# Patient Record
Sex: FEMALE | Race: WHITE | URBAN - NONMETROPOLITAN AREA
[De-identification: names, ages, dates, MRNs, and addresses within clinical notes are randomized per-mention and may not be internally consistent; named-entity substitution may affect disease eponyms.]

---

## 2017-02-07 ENCOUNTER — OFFICE VISIT (OUTPATIENT)
Dept: URGENT CARE | Age: 37
End: 2017-02-07
Payer: COMMERCIAL

## 2017-02-07 VITALS
OXYGEN SATURATION: 99 % | DIASTOLIC BLOOD PRESSURE: 83 MMHG | RESPIRATION RATE: 18 BRPM | HEIGHT: 68 IN | TEMPERATURE: 98.5 F | HEART RATE: 71 BPM | SYSTOLIC BLOOD PRESSURE: 125 MMHG | WEIGHT: 230 LBS | BODY MASS INDEX: 34.86 KG/M2

## 2017-02-07 DIAGNOSIS — L03.115 CELLULITIS OF RIGHT LOWER EXTREMITY: Primary | ICD-10-CM

## 2017-02-07 PROCEDURE — 99203 OFFICE O/P NEW LOW 30 MIN: CPT | Performed by: NURSE PRACTITIONER

## 2017-02-07 RX ORDER — CLINDAMYCIN HYDROCHLORIDE 300 MG/1
300 CAPSULE ORAL 3 TIMES DAILY
Qty: 30 CAPSULE | Refills: 0 | Status: SHIPPED | OUTPATIENT
Start: 2017-02-07 | End: 2017-02-17

## 2017-02-11 LAB
GRAM STAIN RESULT: NORMAL
WOUND/ABSCESS: NORMAL

## 2018-03-26 ENCOUNTER — OUTSIDE FACILITY SERVICE (OUTPATIENT)
Dept: CARDIOLOGY | Facility: CLINIC | Age: 38
End: 2018-03-26

## 2018-03-26 PROCEDURE — 93306 TTE W/DOPPLER COMPLETE: CPT | Performed by: INTERNAL MEDICINE

## 2023-03-28 DIAGNOSIS — N93.9 ABNORMAL UTERINE BLEEDING (AUB): Primary | ICD-10-CM

## 2023-04-05 ENCOUNTER — HOSPITAL ENCOUNTER (OUTPATIENT)
Dept: GENERAL RADIOLOGY | Age: 43
Discharge: HOME | End: 2023-04-05
Payer: COMMERCIAL

## 2023-04-05 DIAGNOSIS — N93.9 ABNORMAL UTERINE BLEEDING (AUB): ICD-10-CM

## 2023-04-05 PROCEDURE — 76830 TRANSVAGINAL US NON-OB: CPT | Performed by: RADIOLOGY

## 2023-04-05 PROCEDURE — 76830 TRANSVAGINAL US NON-OB: CPT

## 2023-04-05 NOTE — PATIENT INSTRUCTIONS
improve your health. You may need to make lifestyle changes to gain or maintain weight and stay healthy, such as getting more healthy foods in your diet and doing exercises to build muscle. Where can you learn more? Go to http://www.woods.com/ and enter S176 to learn more about \"Body Mass Index: Care Instructions. \"  Current as of: August 25, 2022               Content Version: 13.6  © 2006-2023 Healthwise, Incorporated. Care instructions adapted under license by [facility] [place] ([facility]). If you have questions about a medical condition or this instruction, always ask your healthcare professional. [place] [address] any warranty or liability for your use of this information.

## 2023-04-06 ENCOUNTER — OFFICE VISIT (OUTPATIENT)
Dept: OBGYN CLINIC | Age: 43
End: 2023-04-06

## 2023-04-06 VITALS
DIASTOLIC BLOOD PRESSURE: 88 MMHG | WEIGHT: 244 LBS | BODY MASS INDEX: 36.98 KG/M2 | SYSTOLIC BLOOD PRESSURE: 130 MMHG | HEIGHT: 68 IN

## 2023-04-06 DIAGNOSIS — Z01.812 PRE-PROCEDURE LAB EXAM: ICD-10-CM

## 2023-04-06 DIAGNOSIS — Z76.89 ENCOUNTER TO ESTABLISH CARE: Primary | ICD-10-CM

## 2023-04-06 DIAGNOSIS — Z11.51 ENCOUNTER FOR SCREENING FOR HUMAN PAPILLOMAVIRUS (HPV): ICD-10-CM

## 2023-04-06 DIAGNOSIS — Z01.419 ENCOUNTER FOR CERVICAL PAP SMEAR WITH PELVIC EXAM: ICD-10-CM

## 2023-04-06 DIAGNOSIS — N93.9 ABNORMAL UTERINE BLEEDING (AUB): ICD-10-CM

## 2023-04-06 DIAGNOSIS — Z12.4 ENCOUNTER FOR SCREENING FOR CERVICAL CANCER: ICD-10-CM

## 2023-04-06 DIAGNOSIS — R93.89 THICKENED ENDOMETRIUM: ICD-10-CM

## 2023-04-06 DIAGNOSIS — Z12.31 ENCOUNTER FOR SCREENING MAMMOGRAM FOR MALIGNANT NEOPLASM OF BREAST: ICD-10-CM

## 2023-04-06 LAB
CONTROL: PRESENT
HPV16+18+H RISK 12 DNA SPEC-IMP: NORMAL
PREGNANCY TEST URINE, POC: NEGATIVE

## 2023-04-06 RX ORDER — UBIDECARENONE 100 MG
100 CAPSULE ORAL DAILY
COMMUNITY

## 2023-04-06 RX ORDER — POTASSIUM CHLORIDE 750 MG/1
10 TABLET, EXTENDED RELEASE ORAL DAILY
COMMUNITY
Start: 2020-10-01

## 2023-04-06 RX ORDER — ASPIRIN 81 MG/1
81 TABLET, CHEWABLE ORAL DAILY
COMMUNITY
Start: 2020-01-09

## 2023-04-06 NOTE — PROGRESS NOTES
Pt presents today for pap smear and breast exam.    She also complains of     Last mammogram: never  Last pap smear: unsure   Sexually active: Yes  Sexual preference: Male  Contraception: nothing  : 5  Para: 5  AB: 0  Last bone density: never   Last colonoscopy: never  Menarche: age   LMP: 3/11/2023 (lasted 18 days)  Menses: irregular    Had really heavy after valve replacement (was on plavix at that time with ASA). In January was really light but lasted longer.
SMEAR  -     Human papillomavirus (HPV) DNA Probe Thin Prep High Risk  6. Abnormal uterine bleeding (AUB)  -     00071 - [place] BIOPSY OF UTERUS LINING  -     Specimen to Pathology Outpatient  7. Pre-procedure lab exam  -     POCT urine pregnancy       Patient Active Problem List   Diagnosis    Tetralogy of Fallot s/p repair    Tetralogy of Fallot       Patient's last menstrual period was 03/11/2023 (exact date). No obstetric history on file. Past Medical History:   Diagnosis Date    Tetralogy of Fallot      Past Surgical History:   Procedure Laterality Date    CARDIAC SURGERY  09/2020    patching hole and widen valve    CARDIAC VALVE REPLACEMENT  2020    TETRALOGY OF FALLOT REPAIR  01/01/1985    [facility]     Family History   Problem Relation Age of Onset    Heart Disease Maternal Grandmother     Heart Disease Paternal Grandfather      Social History     Tobacco Use    Smoking status: Never    Smokeless tobacco: Not on file   Substance Use Topics    Alcohol use: No       Current Outpatient Medications   Medication Sig Dispense Refill    aspirin 81 MG chewable tablet Take 1 tablet by mouth daily      coenzyme Q10 100 MG CAPS capsule Take 1 capsule by mouth daily      Multiple Vitamin (MULTI-VITAMIN DAILY PO) Take 1 tablet by mouth daily      Omega-3 Fatty Acids (FISH OIL PO) Take 1 capsule by mouth daily      potassium chloride (KLOR-CON M) 10 MEQ extended release tablet Take 1 tablet by mouth daily      Ferrous Sulfate (IRON PO) Take by mouth       No current facility-administered medications for this visit. Allergies   Allergen Reactions    Plavix [Clopidogrel] Hives and Swelling     Vitals:    04/06/23 1114   BP: 130/88   Site: Left Upper Arm   Position: Sitting   Cuff Size: Large Adult   Weight: 244 lb (110.7 kg)   Height: 5' 8\" (1.727 m)     Body mass index is 37.1 kg/m². A comprehensive review of systems was negative except for what was noted in the HPI.      Physical Exam  Constitutional:

## 2023-05-06 SDOH — ECONOMIC STABILITY: HOUSING INSECURITY
IN THE LAST 12 MONTHS, WAS THERE A TIME WHEN YOU DID NOT HAVE A STEADY PLACE TO SLEEP OR SLEPT IN A SHELTER (INCLUDING NOW)?: PATIENT REFUSED

## 2023-05-06 SDOH — ECONOMIC STABILITY: FOOD INSECURITY: WITHIN THE PAST 12 MONTHS, YOU WORRIED THAT YOUR FOOD WOULD RUN OUT BEFORE YOU GOT MONEY TO BUY MORE.: PATIENT DECLINED

## 2023-05-06 SDOH — ECONOMIC STABILITY: INCOME INSECURITY: HOW HARD IS IT FOR YOU TO PAY FOR THE VERY BASICS LIKE FOOD, HOUSING, MEDICAL CARE, AND HEATING?: PATIENT DECLINED

## 2023-05-06 SDOH — ECONOMIC STABILITY: TRANSPORTATION INSECURITY
IN THE PAST 12 MONTHS, HAS LACK OF TRANSPORTATION KEPT YOU FROM MEETINGS, WORK, OR FROM GETTING THINGS NEEDED FOR DAILY LIVING?: PATIENT DECLINED

## 2023-05-06 SDOH — ECONOMIC STABILITY: FOOD INSECURITY: WITHIN THE PAST 12 MONTHS, THE FOOD YOU BOUGHT JUST DIDN'T LAST AND YOU DIDN'T HAVE MONEY TO GET MORE.: PATIENT DECLINED

## 2023-05-08 ASSESSMENT — ENCOUNTER SYMPTOMS
GASTROINTESTINAL NEGATIVE: 1
EYES NEGATIVE: 1
RESPIRATORY NEGATIVE: 1

## 2023-05-08 NOTE — PROGRESS NOTES
Rogelio Smith is a 38 y.o. who presents today for a surgical consultation for her abnormal uterine bleeding. Pt states she has had irregular bleeding since December. Her cycle in March was very heavy, with blood clots. Pt had a valve replacement in 2020, pt gained weight after this. Pt has not followed up with her cardiac surgeon in [place]. Pt is using a natural progesterone cream that she got online. Review of Systems   Constitutional: Negative. HENT: Negative. Eyes: Negative. Respiratory: Negative. Cardiovascular: Negative. Gastrointestinal: Negative. Genitourinary: Negative. Negative for dysuria, frequency, menstrual problem, pelvic pain, urgency and vaginal discharge. Skin: Negative. Neurological: Negative. Psychiatric/Behavioral: Negative.        Past Medical History:   Diagnosis Date    Tetralogy of Fallot        Past Surgical History:   Procedure Laterality Date    CARDIAC SURGERY  09/2020    patching hole and widen valve    CARDIAC VALVE REPLACEMENT  2020    TETRALOGY OF FALLOT REPAIR  01/01/1985    [facility]       Family History   Problem Relation Age of Onset    Heart Disease Maternal Grandmother     Heart Disease Paternal Grandfather        Social History     Socioeconomic History    Marital status:      Spouse name: Not on file    Number of children: Not on file    Years of education: Not on file    Highest education level: Not on file   Occupational History    Not on file   Tobacco Use    Smoking status: Never    Smokeless tobacco: Not on file   Substance and Sexual Activity    Alcohol use: No    Drug use: No    Sexual activity: Not on file   Other Topics Concern    Not on file   Social History Narrative    Not on file     Social Determinants of Health     Financial Resource Strain: Not on file   Food Insecurity: Not on file   Transportation Needs: Not on file   Physical Activity: Not on file   Stress: Not on file   Social Connections: Not on

## 2023-05-09 ENCOUNTER — OFFICE VISIT (OUTPATIENT)
Dept: OBGYN CLINIC | Age: 43
End: 2023-05-09
Payer: COMMERCIAL

## 2023-05-09 VITALS
BODY MASS INDEX: 36.98 KG/M2 | SYSTOLIC BLOOD PRESSURE: 134 MMHG | WEIGHT: 244 LBS | DIASTOLIC BLOOD PRESSURE: 91 MMHG | HEART RATE: 73 BPM | HEIGHT: 68 IN

## 2023-05-09 DIAGNOSIS — R93.89 THICKENED ENDOMETRIUM: ICD-10-CM

## 2023-05-09 DIAGNOSIS — Z71.9 ENCOUNTER FOR CONSULTATION: Primary | ICD-10-CM

## 2023-05-09 DIAGNOSIS — N93.9 ABNORMAL UTERINE BLEEDING (AUB): ICD-10-CM

## 2023-05-09 PROCEDURE — 99214 OFFICE O/P EST MOD 30 MIN: CPT | Performed by: OBSTETRICS & GYNECOLOGY

## 2023-05-09 NOTE — PROGRESS NOTES
Pt is here for a surgery consult for D&C.      Thickened, heterogeneous endometrium, measuring up to 21 mm in thickness shown on ultrasound 4/05

## 2023-05-18 ENCOUNTER — TELEPHONE (OUTPATIENT)
Dept: OBGYN CLINIC | Age: 43
End: 2023-05-18

## 2024-02-07 DIAGNOSIS — N91.2 AMENORRHEA: Primary | ICD-10-CM

## 2024-02-08 DIAGNOSIS — N91.2 AMENORRHEA: ICD-10-CM

## 2024-02-08 LAB
GONADOTROPIN, CHORIONIC (HCG) QUANT: ABNORMAL MIU/ML (ref 0–5.3)
PROGEST SERPL-MCNC: 14.07 NG/ML

## 2024-02-09 DIAGNOSIS — N91.2 AMENORRHEA: Primary | ICD-10-CM

## 2024-02-13 ENCOUNTER — INITIAL PRENATAL (OUTPATIENT)
Dept: OBGYN CLINIC | Age: 44
End: 2024-02-13

## 2024-02-13 VITALS — SYSTOLIC BLOOD PRESSURE: 142 MMHG | DIASTOLIC BLOOD PRESSURE: 78 MMHG | WEIGHT: 252 LBS | BODY MASS INDEX: 38.32 KG/M2

## 2024-02-13 DIAGNOSIS — Z3A.12 12 WEEKS GESTATION OF PREGNANCY: ICD-10-CM

## 2024-02-13 DIAGNOSIS — O09.521 MULTIGRAVIDA OF ADVANCED MATERNAL AGE IN FIRST TRIMESTER: ICD-10-CM

## 2024-02-13 DIAGNOSIS — Z34.81 ENCOUNTER FOR SUPERVISION OF OTHER NORMAL PREGNANCY IN FIRST TRIMESTER: Primary | ICD-10-CM

## 2024-02-13 DIAGNOSIS — Z3A.12 12 WEEKS GESTATION OF PREGNANCY: Primary | ICD-10-CM

## 2024-02-13 LAB
ABO/RH: NORMAL
ANTIBODY SCREEN: NORMAL
HCV AB SERPL QL IA: NORMAL

## 2024-02-13 PROCEDURE — 0500F INITIAL PRENATAL CARE VISIT: CPT | Performed by: ADVANCED PRACTICE MIDWIFE

## 2024-02-13 NOTE — PROGRESS NOTES
CNM Prenatal Office Note  Subjective:  Jewels Palomares is here for initial obstetrical visit. She has had confirmation of pregnancy. Her history has been reviewed and OB complications identified. Today she is 12w0d weeks EGA.     OB Complications Identified:  AMA  Pulmonary Valve replacement  Hx tetrology of Felot    Problems/Complaints today:  none  Objective:  Mother's Prenatal Vitals  BP: (!) 142/78  Weight - Scale: 114.3 kg (252 lb)  Prenatal Fetal Information  Fetal HR: 152 pg  Pt is A&Ox3, in no acute distress. Normocephalic, atraumatic. PERRL. Resp even and non-labored. Skin pink, warm & dry. Gravid abdomen. PRYOR's well. Gait steady.   Assessment:    IUP at 12w0d wks      Diagnosis Orders   1. 12 weeks gestation of pregnancy  Chlamydia/N. Gonorrhoeae/T.Vaginalis, Urine    Varicella Zoster Antibody, IgG    Herpes simplex virus (HSV) I/II antibodies IgG & IgM w/ reflex    Hepatitis C Antibody    HIV Obstetric Panel    Culture, Urine      2. Multigravida of advanced maternal age in first trimester  Chlamydia/N. Gonorrhoeae/T.Vaginalis, Urine    Varicella Zoster Antibody, IgG    Herpes simplex virus (HSV) I/II antibodies IgG & IgM w/ reflex    Hepatitis C Antibody    HIV Obstetric Panel    Culture, Urine        Plan:  Problems/Complaints Management Plan:  Continue progesterone through 14 weeks  PNL today.  Routine OB Management Plan:  Pt counseled on balanced nutrition, adequate fluid intake, taking PNV daily, and exercise. Continue with routine prenatal care.  RTC in 4 wks for prenatal visit, u/s, physical exam, and results  MEDICATIONS:  No orders of the defined types were placed in this encounter.    ORDERS:  Orders Placed This Encounter   Procedures    Chlamydia/N. Gonorrhoeae/T.Vaginalis, Urine    Culture, Urine    Varicella Zoster Antibody, IgG    Herpes simplex virus (HSV) I/II antibodies IgG & IgM w/ reflex    Hepatitis C Antibody    HIV Obstetric Panel

## 2024-02-13 NOTE — PROGRESS NOTES
Patient presents today for routine prenatal care. Pt denies any vaginal leaking bleeding or contractions.

## 2024-02-15 LAB
BASOPHILS # BLD: 0 K/UL (ref 0–0.2)
BASOPHILS NFR BLD: 0.4 % (ref 0–1)
EOSINOPHIL # BLD: 0.2 K/UL (ref 0–0.6)
EOSINOPHIL NFR BLD: 2.1 % (ref 0–5)
ERYTHROCYTE [DISTWIDTH] IN BLOOD BY AUTOMATED COUNT: 16.1 % (ref 11.5–14.5)
HBV SURFACE AG SERPL QL IA: ABNORMAL
HCT VFR BLD AUTO: 35.7 % (ref 37–47)
HGB BLD-MCNC: 11.8 G/DL (ref 12–16)
HIV-1 P24 AG: ABNORMAL
HIV1+2 AB SERPLBLD QL IA.RAPID: ABNORMAL
IMM GRANULOCYTES # BLD: 0.1 K/UL
LYMPHOCYTES # BLD: 2.2 K/UL (ref 1.1–4.5)
LYMPHOCYTES NFR BLD: 22.4 % (ref 20–40)
MCH RBC QN AUTO: 26.5 PG (ref 27–31)
MCHC RBC AUTO-ENTMCNC: 33.1 G/DL (ref 33–37)
MCV RBC AUTO: 80 FL (ref 81–99)
MONOCYTES # BLD: 0.6 K/UL (ref 0–0.9)
MONOCYTES NFR BLD: 6.6 % (ref 0–10)
NEUTROPHILS # BLD: 6.5 K/UL (ref 1.5–7.5)
NEUTS SEG NFR BLD: 67.9 % (ref 50–65)
PLATELET # BLD AUTO: 271 K/UL (ref 130–400)
PMV BLD AUTO: 9.7 FL (ref 9.4–12.3)
RBC # BLD AUTO: 4.46 M/UL (ref 4.2–5.4)
RPR SER QL: ABNORMAL
RUBV IGG SER-ACNC: REACTIVE [IU]/ML
WBC # BLD AUTO: 9.6 K/UL (ref 4.8–10.8)

## 2024-02-17 LAB
HSV1 GG IGG SER-ACNC: <0.01 IV
HSV1+2 IGG SER IA-ACNC: 1.53 IV
HSV1+2 IGM SER IA-ACNC: 0.23 IV
HSV2 GG IGG SER-ACNC: 0.03 IV

## 2024-02-19 LAB — VZV IGG SER QL IA: 1.27

## 2024-02-20 ENCOUNTER — HOSPITAL ENCOUNTER (OUTPATIENT)
Dept: NON INVASIVE DIAGNOSTICS | Age: 44
Discharge: HOME | End: 2024-02-20
Payer: COMMERCIAL

## 2024-02-20 DIAGNOSIS — Z95.2 STATUS POST PULMONARY VALVE REPLACEMENT: ICD-10-CM

## 2024-02-20 PROCEDURE — 93306 TTE W/DOPPLER COMPLETE: CPT

## 2024-03-09 SDOH — ECONOMIC STABILITY: INCOME INSECURITY: HOW HARD IS IT FOR YOU TO PAY FOR THE VERY BASICS LIKE FOOD, HOUSING, MEDICAL CARE, AND HEATING?: PATIENT DECLINED

## 2024-03-09 SDOH — ECONOMIC STABILITY: HOUSING INSECURITY
IN THE LAST 12 MONTHS, WAS THERE A TIME WHEN YOU DID NOT HAVE A STEADY PLACE TO SLEEP OR SLEPT IN A SHELTER (INCLUDING NOW)?: PATIENT DECLINED

## 2024-03-09 SDOH — ECONOMIC STABILITY: FOOD INSECURITY: WITHIN THE PAST 12 MONTHS, THE FOOD YOU BOUGHT JUST DIDN'T LAST AND YOU DIDN'T HAVE MONEY TO GET MORE.: PATIENT DECLINED

## 2024-03-09 SDOH — ECONOMIC STABILITY: FOOD INSECURITY: WITHIN THE PAST 12 MONTHS, YOU WORRIED THAT YOUR FOOD WOULD RUN OUT BEFORE YOU GOT MONEY TO BUY MORE.: PATIENT DECLINED

## 2024-03-12 ENCOUNTER — ROUTINE PRENATAL (OUTPATIENT)
Dept: OBGYN CLINIC | Age: 44
End: 2024-03-12

## 2024-03-12 VITALS
DIASTOLIC BLOOD PRESSURE: 78 MMHG | HEART RATE: 82 BPM | WEIGHT: 250 LBS | BODY MASS INDEX: 38.01 KG/M2 | SYSTOLIC BLOOD PRESSURE: 122 MMHG

## 2024-03-12 DIAGNOSIS — O09.522 ADVANCED MATERNAL AGE IN MULTIGRAVIDA, SECOND TRIMESTER: ICD-10-CM

## 2024-03-12 DIAGNOSIS — Z3A.16 16 WEEKS GESTATION OF PREGNANCY: Primary | ICD-10-CM

## 2024-03-12 PROCEDURE — 0502F SUBSEQUENT PRENATAL CARE: CPT | Performed by: ADVANCED PRACTICE MIDWIFE

## 2024-03-12 NOTE — PATIENT INSTRUCTIONS
Patient Education        Weeks 14 to 18 of Your Pregnancy: Care Instructions  Around this time, you may start to look pregnant. Your baby is now able to pass urine. And the first stool (meconium) is starting to collect in your baby's intestines. Hair is starting to grow on your baby's head.    You may notice some skin changes, such as itchy spots on your palms or acne on your face.    At your next doctor visit, you may have an ultrasound. So you might think about whether you want to know the sex of your baby. Also ask your doctor about flu and COVID-19 shots.     How to reduce stress   Ask for help when you need it.  Try to avoid things that cause you stress.  Seek out things that relieve stress, such as breathing exercises or yoga.     How to get exercise   If you don't usually exercise, start slowly. Short walks may be a good choice.  Try to be active 30 minutes a day, at least 5 days a week.  Avoid activities where you're more likely to fall.  Use light weights to reduce stress on your joints.     How to stay at a healthy weight for you   Talk to your doctor or midwife about how much weight you should gain.  It's generally best to gain:  About 28 to 40 pounds if you're underweight.  About 25 to 35 pounds if you're at a healthy weight.  About 15 to 25 pounds if you're overweight.  About 11 to 20 pounds if you're very overweight (obese).  Follow-up care is a key part of your treatment and safety. Be sure to make and go to all appointments, and call your doctor if you are having problems. It's also a good idea to know your test results and keep a list of the medicines you take.  Where can you learn more?  Go to https://www.SHADOW.net/patientEd and enter I453 to learn more about \"Weeks 14 to 18 of Your Pregnancy: Care Instructions.\"  Current as of: July 10, 2023               Content Version: 14.0  © 7131-2492 Healthwise, Incorporated.   Care instructions adapted under license by [employer]. If you have

## 2024-03-12 NOTE — PROGRESS NOTES
[facility] Prenatal Office Note  Subjective:  Jewels Palomares is here for a return obstetrical visit. Today she is 16w0d weeks EGA.   She is taking her prenatal vitamins and is aware of nutrition needs. She reports the following:    Problems/complaints today:  None  Objective:  Mother's Prenatal Vitals  BP: 122/78  Weight - Scale: 113.4 kg (250 lb)  Pulse: 82  Patient Position: Sitting  Prenatal Fetal Information  Fetal HR: 152  Movement: Increased  Pt is A&Ox3, in no acute distress. Normocephalic, atraumatic. PERRL. Resp even and non-labored. Skin pink, warm & dry. Gravid abdomen. PRYOR's well. Gait steady.   Assessment:    IUP at 16w0d wks      Diagnosis Orders   1. 16 weeks gestation of pregnancy        2. Advanced maternal age in multigravida, second trimester          Plan:  Problems/complaints Management Plan:  None  Routine OB Management Plan:  Pt counseled on balanced nutrition, adequate fluid intake, taking PNV daily, and exercise along with  Anatomy scheduled  Continue with routine prenatal care.  RTC in 4 wks for prenatal visit.      MEDICATIONS:  No orders of the defined types were placed in this encounter.    ORDERS:  No orders of the defined types were placed in this encounter.      More than 50% of this 20 min visit was education and counseling.      I aVnesa Jordan LPN, am scribing for and in the presence of Shaye Guerrero CNM  3/12/24  10:10 AM CDT .  I have seen and examined the patient independently.  I reviewed all laboratory and imaging studies that are relevant.  I have reviewed and made any appropriate changes to the HPI.    Electronically signed by DAMON Loya CNM on 3/12/24  at 10:25 AM CDT

## 2024-04-08 NOTE — PATIENT INSTRUCTIONS
Patient Education        Weeks 18 to 22 of Your Pregnancy: Care Instructions  At this stage you may find that your nausea and fatigue are gone. You may feel better overall and have more energy. But you might now also have some new discomforts, like sleep problems or leg cramps.    You may start to feel your baby move. These movements can feel like butterflies or bubbles.    Babies at this stage can now suck their thumbs.    Get some exercise every day.  And avoid caffeine late in the day.     Take a warm shower or bath before bed.  Try relaxation exercises to calm your mind and body.     Use extra pillows.  They can help you get comfortable.     Don't use sleeping pills or alcohol.  They could harm your baby.     For leg cramps, stretch and apply heat.  A warm bath, leg warmers, a heating pad, or a hot water bottle can help with muscle aches.   Stretches for leg cramps    Straighten your leg and bend your foot (flex your ankle) slowly upward, toward your knee. Bend your toes up and down.    Stand on a flat surface. Stretch your toes upward. For balance, hold on to the wall or something stable. If it feels okay, take small steps walking on your heels.  Follow-up care is a key part of your treatment and safety. Be sure to make and go to all appointments, and call your doctor if you are having problems. It's also a good idea to know your test results and keep a list of the medicines you take.  Where can you learn more?  Go to https://www.Toonimo.net/patientEd and enter W603 to learn more about \"Weeks 18 to 22 of Your Pregnancy: Care Instructions.\"  Current as of: July 10, 2023               Content Version: 14.0  © 4726-7461 [employer].   Care instructions adapted under license by [employer]. If you have questions about a medical condition or this instruction, always ask your healthcare professional. [employer] disclaims any warranty or liability for your use of this information.

## 2024-04-09 ENCOUNTER — ROUTINE PRENATAL (OUTPATIENT)
Dept: OBGYN CLINIC | Age: 44
End: 2024-04-09

## 2024-04-09 VITALS — DIASTOLIC BLOOD PRESSURE: 89 MMHG | SYSTOLIC BLOOD PRESSURE: 136 MMHG | WEIGHT: 254 LBS | BODY MASS INDEX: 38.62 KG/M2

## 2024-04-09 DIAGNOSIS — Z34.82 PRENATAL CARE, SUBSEQUENT PREGNANCY IN SECOND TRIMESTER: ICD-10-CM

## 2024-04-09 DIAGNOSIS — Z3A.20 20 WEEKS GESTATION OF PREGNANCY: Primary | ICD-10-CM

## 2024-04-09 DIAGNOSIS — O09.522 ADVANCED MATERNAL AGE IN MULTIGRAVIDA, SECOND TRIMESTER: ICD-10-CM

## 2024-04-09 PROCEDURE — 0502F SUBSEQUENT PRENATAL CARE: CPT | Performed by: OBSTETRICS & GYNECOLOGY

## 2024-04-09 NOTE — PROGRESS NOTES
Pt states she feels ok and is without complaints. Pt admits to +FM and denies ctxs, ROM, and vaginal bleeding. PTL precautions/FKC reviewed with pt.

## 2024-05-07 ENCOUNTER — ROUTINE PRENATAL (OUTPATIENT)
Dept: OBGYN CLINIC | Age: 44
End: 2024-05-07

## 2024-05-07 VITALS — WEIGHT: 257 LBS | DIASTOLIC BLOOD PRESSURE: 79 MMHG | BODY MASS INDEX: 39.08 KG/M2 | SYSTOLIC BLOOD PRESSURE: 125 MMHG

## 2024-05-07 DIAGNOSIS — O09.522 ADVANCED MATERNAL AGE IN MULTIGRAVIDA, SECOND TRIMESTER: Primary | ICD-10-CM

## 2024-05-07 DIAGNOSIS — Z3A.24 24 WEEKS GESTATION OF PREGNANCY: ICD-10-CM

## 2024-05-07 PROCEDURE — 0502F SUBSEQUENT PRENATAL CARE: CPT | Performed by: ADVANCED PRACTICE MIDWIFE

## 2024-05-07 NOTE — PROGRESS NOTES
[facility] Prenatal Office Note  Subjective:  Jewels Palomares is here for a return obstetrical visit. Today she is 24w0d weeks EGA.   She is taking her prenatal vitamins and is aware of nutrition needs. She reports the following:    Problems/complaints today:  None  Objective:  Mother's Prenatal Vitals  BP: 125/79  Weight - Scale: 116.6 kg (257 lb)  Prenatal Fetal Information  Fetal HR: 143  Movement: Present  Pt is A&Ox3, in no acute distress. Normocephalic, atraumatic. PERRL. Resp even and non-labored. Skin pink, warm & dry. Gravid abdomen. PRYOR's well. Gait steady.   Assessment:    IUP at 24w0d wks      Diagnosis Orders   1. Advanced maternal age in multigravida, second trimester        2. 24 weeks gestation of pregnancy          Plan:  Problems/complaints Management Plan:  None  Routine OB Management Plan:  Pt counseled on balanced nutrition, adequate fluid intake, taking PNV daily, and exercise along with  GCT next visit  Continue with routine prenatal care.  RTC in 4 wks for prenatal visit.      MEDICATIONS:  No orders of the defined types were placed in this encounter.    ORDERS:  No orders of the defined types were placed in this encounter.      More than 50% of this 20 min visit was education and counseling.        I Vanesa Jordan LPN, am scribing for and in the presence of Shaye Guerrero CNM  5/7/24  10:56 AM CDT .  I have seen and examined the patient independently.  I reviewed all laboratory and imaging studies that are relevant.  I have reviewed and made any appropriate changes to the HPI.    Electronically signed by DAMON Loya CNM on 5/7/24  at 12:14 PM CDT

## 2024-05-13 DIAGNOSIS — Z3A.24 24 WEEKS GESTATION OF PREGNANCY: Primary | ICD-10-CM

## 2024-05-13 DIAGNOSIS — Z3A.24 24 WEEKS GESTATION OF PREGNANCY: ICD-10-CM

## 2024-06-04 ENCOUNTER — ROUTINE PRENATAL (OUTPATIENT)
Dept: OBGYN CLINIC | Age: 44
End: 2024-06-04

## 2024-06-04 VITALS
SYSTOLIC BLOOD PRESSURE: 125 MMHG | HEART RATE: 89 BPM | BODY MASS INDEX: 39.08 KG/M2 | WEIGHT: 257 LBS | DIASTOLIC BLOOD PRESSURE: 75 MMHG

## 2024-06-04 DIAGNOSIS — Q24.9 MATERNAL CONGENITAL CARDIAC ANOMALY COMPLICATING PREGNANCY: ICD-10-CM

## 2024-06-04 DIAGNOSIS — O99.891 MATERNAL CONGENITAL CARDIAC ANOMALY COMPLICATING PREGNANCY: ICD-10-CM

## 2024-06-04 DIAGNOSIS — Z87.74 PAST HISTORY OF TETRALOGY OF FALLOT, POST SURGICAL REPAIR: ICD-10-CM

## 2024-06-04 DIAGNOSIS — O09.522 ADVANCED MATERNAL AGE IN MULTIGRAVIDA, SECOND TRIMESTER: ICD-10-CM

## 2024-06-04 DIAGNOSIS — E66.8 OTHER OBESITY AFFECTING PREGNANCY, ANTEPARTUM: ICD-10-CM

## 2024-06-04 DIAGNOSIS — Z3A.28 28 WEEKS GESTATION OF PREGNANCY: ICD-10-CM

## 2024-06-04 DIAGNOSIS — O09.522 ADVANCED MATERNAL AGE IN MULTIGRAVIDA, SECOND TRIMESTER: Primary | ICD-10-CM

## 2024-06-04 DIAGNOSIS — O99.210 OTHER OBESITY AFFECTING PREGNANCY, ANTEPARTUM: ICD-10-CM

## 2024-06-04 DIAGNOSIS — Z3A.28 28 WEEKS GESTATION OF PREGNANCY: Primary | ICD-10-CM

## 2024-06-04 DIAGNOSIS — O09.523 ADVANCED MATERNAL AGE IN MULTIGRAVIDA, THIRD TRIMESTER: ICD-10-CM

## 2024-06-04 LAB
ANTIBODY SCREEN: NORMAL
BASOPHILS # BLD: 0 K/UL (ref 0–0.2)
BASOPHILS NFR BLD: 0.4 % (ref 0–1)
EOSINOPHIL # BLD: 0.2 K/UL (ref 0–0.6)
EOSINOPHIL NFR BLD: 2.2 % (ref 0–5)
ERYTHROCYTE [DISTWIDTH] IN BLOOD BY AUTOMATED COUNT: 14 % (ref 11.5–14.5)
GLUCOSE 1H P MEAL SERPL-MCNC: 79 MG/DL (ref 75–140)
HCT VFR BLD AUTO: 35.7 % (ref 37–47)
HGB BLD-MCNC: 11.3 G/DL (ref 12–16)
IMM GRANULOCYTES # BLD: 0.1 K/UL
LYMPHOCYTES # BLD: 1.4 K/UL (ref 1.1–4.5)
LYMPHOCYTES NFR BLD: 19.2 % (ref 20–40)
MCH RBC QN AUTO: 28 PG (ref 27–31)
MCHC RBC AUTO-ENTMCNC: 31.7 G/DL (ref 33–37)
MCV RBC AUTO: 88.4 FL (ref 81–99)
MONOCYTES # BLD: 0.6 K/UL (ref 0–0.9)
MONOCYTES NFR BLD: 8.5 % (ref 0–10)
NEUTROPHILS # BLD: 5 K/UL (ref 1.5–7.5)
NEUTS SEG NFR BLD: 69 % (ref 50–65)
PLATELET # BLD AUTO: 223 K/UL (ref 130–400)
PMV BLD AUTO: 10 FL (ref 9.4–12.3)
RBC # BLD AUTO: 4.04 M/UL (ref 4.2–5.4)
WBC # BLD AUTO: 7.3 K/UL (ref 4.8–10.8)

## 2024-06-04 PROCEDURE — 0502F SUBSEQUENT PRENATAL CARE: CPT | Performed by: ADVANCED PRACTICE MIDWIFE

## 2024-06-04 NOTE — PROGRESS NOTES
CNM Prenatal Office Note  Subjective:  Jewels Palomares is here for a return obstetrical visit. Today she is 28w0d weeks EGA.  Pt does feel fetal movement regularly. Denies headaches, RUQ pain, or visual changes as well as contractions, vaginal bleeding or leaking of fluid. 1 hour GCT today. Rhogam indicated.   Problems/Complaints today:  none  Objective:  Mother's Prenatal Vitals  BP: 125/75  Weight - Scale: 116.6 kg (257 lb)  Pulse: 89  Patient Position: Sitting  Prenatal Fetal Information  Fundal Height (cm): 29 cm  Fetal HR:   Movement: Present  Pt is A&Ox3, in no acute distress. Normocephalic, atraumatic. PERRL. Resp even and non-labored. Skin pink, warm & dry. Gravid abdomen. PRYOR's well. Gait steady. EPDS Screenin/30  Assessment:    IUP at 28w0d wks      Diagnosis Orders   1. 28 weeks gestation of pregnancy        2. Advanced maternal age in multigravida, third trimester        3. Grand multipara in labor in third trimester        4. Other obesity affecting pregnancy, antepartum        5. Past history of tetralogy of Fallot, post surgical repair        6. Maternal congenital cardiac anomaly complicating pregnancy          Plan:  Problems/Complaints Management Plan:  Growth u/s this week/next week  Cardiology f/u for Jewels  Begin weekly surveillance @ 34 weeks  Routine OB Management Plan:  Third trimester teaching completed including warning signs for pre-eclampsia (blurred vision, seeing spots/sparkles, sudden increased weight gain or profound edema, epigastric pain), FKC (decreased fetal movments),  labor ( contractions or watery discharge, vaginal bleeding, cramping), n/v, chills, and or fever. Instructed pt to come to office or go to LDR if these symptoms presented. Pt voiced understanding.   Pt counseled on balanced nutrition, adequate fluid intake, taking PNV daily, and exercise along with GHTN precautions, Kick count, and  labor  Continue with routine prenatal care.  RTC

## 2024-06-06 LAB — RPR SER QL: NORMAL

## 2024-06-10 DIAGNOSIS — Z3A.28 28 WEEKS GESTATION OF PREGNANCY: ICD-10-CM

## 2024-06-10 DIAGNOSIS — Z3A.28 28 WEEKS GESTATION OF PREGNANCY: Primary | ICD-10-CM

## 2024-06-17 NOTE — PATIENT INSTRUCTIONS
diphtheria, and pertussis.  Diphtheria and pertussis spread from person to person. Tetanus enters the body through cuts or wounds.  TETANUS (T) causes painful stiffening of the muscles. Tetanus can lead to serious health problems, including being unable to open the mouth, having trouble swallowing and breathing, or death.  DIPHTHERIA (D) can lead to difficulty breathing, heart failure, paralysis, or death.  PERTUSSIS (aP), also known as \"whooping cough,\" can cause uncontrollable, violent coughing that makes it hard to breathe, eat, or drink. Pertussis can be extremely serious especially in babies and young children, causing pneumonia, convulsions, brain damage, or death. In teens and adults, it can cause weight loss, loss of bladder control, passing out, and rib fractures from severe coughing.  Tdap vaccine  Tdap is only for children 7 years and older, adolescents, and adults.  Adolescents should receive a single dose of Tdap, preferably at age 11 or 12 years.  Pregnant people should get a dose of Tdap during every pregnancy, preferably during the early part of the third trimester, to help protect the  from pertussis. Infants are most at risk for severe, life-threatening complications from pertussis.  Adults who have never received Tdap should get a dose of Tdap.  Also, adults should receive a booster dose of either Tdap or Td (a different vaccine that protects against tetanus and diphtheria but not pertussis) every 10 years, or after 5 years in the case of a severe or dirty wound or burn.  Tdap may be given at the same time as other vaccines.  Talk with your health care provider  Tell your vaccination provider if the person getting the vaccine:  Has had an allergic reaction after a previous dose of any vaccine that protects against tetanus, diphtheria, or pertussis, or has any severe, life-threatening allergies  Has had a coma, decreased level of consciousness, or prolonged seizures within 7 days after a

## 2024-06-18 ENCOUNTER — TELEPHONE (OUTPATIENT)
Dept: OBGYN CLINIC | Age: 44
End: 2024-06-18

## 2024-06-18 ENCOUNTER — ROUTINE PRENATAL (OUTPATIENT)
Dept: OBGYN CLINIC | Age: 44
End: 2024-06-18

## 2024-06-18 VITALS
SYSTOLIC BLOOD PRESSURE: 119 MMHG | DIASTOLIC BLOOD PRESSURE: 71 MMHG | WEIGHT: 259 LBS | BODY MASS INDEX: 39.38 KG/M2 | HEART RATE: 88 BPM

## 2024-06-18 DIAGNOSIS — O09.522 ADVANCED MATERNAL AGE IN MULTIGRAVIDA, SECOND TRIMESTER: ICD-10-CM

## 2024-06-18 DIAGNOSIS — O99.210 OTHER OBESITY AFFECTING PREGNANCY, ANTEPARTUM: ICD-10-CM

## 2024-06-18 DIAGNOSIS — O09.93 HIGH-RISK PREGNANCY IN THIRD TRIMESTER: ICD-10-CM

## 2024-06-18 DIAGNOSIS — Z87.74 PAST HISTORY OF TETRALOGY OF FALLOT, POST SURGICAL REPAIR: ICD-10-CM

## 2024-06-18 DIAGNOSIS — O99.891 MATERNAL CONGENITAL CARDIAC ANOMALY COMPLICATING PREGNANCY: ICD-10-CM

## 2024-06-18 DIAGNOSIS — Z3A.29 29 WEEKS GESTATION OF PREGNANCY: ICD-10-CM

## 2024-06-18 DIAGNOSIS — E66.8 OTHER OBESITY AFFECTING PREGNANCY, ANTEPARTUM: ICD-10-CM

## 2024-06-18 DIAGNOSIS — O09.523 ADVANCED MATERNAL AGE IN MULTIGRAVIDA, THIRD TRIMESTER: Primary | ICD-10-CM

## 2024-06-18 DIAGNOSIS — Q24.9 MATERNAL CONGENITAL CARDIAC ANOMALY COMPLICATING PREGNANCY: ICD-10-CM

## 2024-06-18 PROCEDURE — 0502F SUBSEQUENT PRENATAL CARE: CPT | Performed by: ADVANCED PRACTICE MIDWIFE

## 2024-06-18 NOTE — PROGRESS NOTES
[facility] Prenatal Office Note  Subjective:  Jewels Palomares is here for a return obstetrical visit. Today she is 30w0d weeks EGA.  Pt does feel fetal movement regularly. Denies headaches, RUQ pain, or visual changes as well as contractions, vaginal bleeding or leaking of fluid.     Problems/complaints today:  None  Objective:  Mother's Prenatal Vitals  BP: 119/71  Weight - Scale: 117.5 kg (259 lb)  Pulse: 88  Patient Position: Sitting  Prenatal Fetal Information  Fundal Height (cm): 30 cm  Fetal HR: 158  Movement: Present  Pt is A&Ox3, in no acute distress. Normocephalic, atraumatic. PERRL. Resp even and non-labored. Skin pink, warm & dry. Gravid abdomen. PRYOR's well. Gait steady.   Assessment:    IUP at 30w0d wks      Diagnosis Orders   1. Advanced maternal age in multigravida, third trimester        2. Grand multipara in labor in third trimester        3. Other obesity affecting pregnancy, antepartum        4. Past history of tetralogy of Fallot, post surgical repair        5. Advanced maternal age in multigravida, second trimester        6. Maternal congenital cardiac anomaly complicating pregnancy        7. 29 weeks gestation of pregnancy        8. High-risk pregnancy in third trimester          Plan:  Problems/Complaints Management Plan:  None  Routine OB Management Plan:  Pt counseled on balanced nutrition, adequate fluid intake, taking PNV daily, and exercise along with GHTN precautions, Kick count, and  labor  Continue with routine prenatal care.   surveillance indicated for AMA, Pulmonary valve replacement  RTC in 2 wks for prenatal visit    MEDICATIONS:  No orders of the defined types were placed in this encounter.    ORDERS:  No orders of the defined types were placed in this encounter.      More than 50% of this 20 min visit was education and counseling.        I Vanesa Jordan LPN, am scribing for and in the presence of Shaye Guerrero CNM  24  9:36 AM CDT .  I have seen and examined

## 2024-06-18 NOTE — TELEPHONE ENCOUNTER
Patient called she needs  her appointment time on July 2nd she needs a morning appointment.  Please called patient .      Thank you

## 2024-07-02 ENCOUNTER — ROUTINE PRENATAL (OUTPATIENT)
Dept: OBGYN CLINIC | Age: 44
End: 2024-07-02

## 2024-07-02 VITALS
DIASTOLIC BLOOD PRESSURE: 84 MMHG | WEIGHT: 258 LBS | HEART RATE: 90 BPM | BODY MASS INDEX: 39.23 KG/M2 | SYSTOLIC BLOOD PRESSURE: 136 MMHG

## 2024-07-02 DIAGNOSIS — O09.523 ADVANCED MATERNAL AGE IN MULTIGRAVIDA, THIRD TRIMESTER: ICD-10-CM

## 2024-07-02 DIAGNOSIS — Q24.9 MATERNAL CONGENITAL CARDIAC ANOMALY COMPLICATING PREGNANCY: ICD-10-CM

## 2024-07-02 DIAGNOSIS — Z3A.31 31 WEEKS GESTATION OF PREGNANCY: Primary | ICD-10-CM

## 2024-07-02 DIAGNOSIS — O09.522 ADVANCED MATERNAL AGE IN MULTIGRAVIDA, SECOND TRIMESTER: ICD-10-CM

## 2024-07-02 DIAGNOSIS — Z87.74 PAST HISTORY OF TETRALOGY OF FALLOT, POST SURGICAL REPAIR: ICD-10-CM

## 2024-07-02 DIAGNOSIS — O99.891 MATERNAL CONGENITAL CARDIAC ANOMALY COMPLICATING PREGNANCY: ICD-10-CM

## 2024-07-02 DIAGNOSIS — O99.210 OTHER OBESITY AFFECTING PREGNANCY, ANTEPARTUM: ICD-10-CM

## 2024-07-02 DIAGNOSIS — E66.8 OTHER OBESITY AFFECTING PREGNANCY, ANTEPARTUM: ICD-10-CM

## 2024-07-02 PROCEDURE — 0502F SUBSEQUENT PRENATAL CARE: CPT | Performed by: ADVANCED PRACTICE MIDWIFE

## 2024-07-02 NOTE — PROGRESS NOTES
[facility] Prenatal Office Note  Subjective:  Jewels Palomares is here for a return obstetrical visit. Today she is 32w0d weeks EGA. She is doing well, taking her PNV as directed, and has no complaints.  She  does not have vaginal bleeding, n/v, syncope, or headaches.  Pt does feel fetal movement regularly.    Objective:  Mother's Prenatal Vitals  BP: 136/84  Weight - Scale: 117 kg (258 lb)  Pulse: 90  Patient Position: Sitting  Prenatal Fetal Information  Fundal Height (cm): 33 cm  Fetal HR: 142  Pt is A&Ox3, in no acute distress. Normocephalic, atraumatic. PERRL. Resp even and non-labored. Skin pink, warm & dry. Gravid abdomen. PRYOR's well. Gait steady.   Assessment:    IUP at 32w0d wks      Diagnosis Orders   1. 31 weeks gestation of pregnancy        2. Grand multipara in labor in third trimester        3. Advanced maternal age in multigravida, third trimester        4. Other obesity affecting pregnancy, antepartum        5. Past history of tetralogy of Fallot, post surgical repair        6. Advanced maternal age in multigravida, second trimester        7. Maternal congenital cardiac anomaly complicating pregnancy          Plan:  Pt counseled on balanced nutrition, adequate fluid intake, taking PNV daily, and exercise along with GHTN precautions, Kick count, and  labor  Continue with routine prenatal care.   surveillance indicated for AMA  Return in about 3 years (around 2027).   Growth u/s @ 32 weeks    MEDICATIONS:  No orders of the defined types were placed in this encounter.    ORDERS:  No orders of the defined types were placed in this encounter.      More than 50% of this 20 min visit was education and counseling.      I PARKER Hale, am scribing for and in the presence of KATINA Loya.  24  10:25 AM CDT   I have seen and examined the patient independently.  I reviewed all laboratory and imaging studies that are relevant.  I have reviewed and made any appropriate changes

## 2024-07-15 DIAGNOSIS — Z3A.33 33 WEEKS GESTATION OF PREGNANCY: ICD-10-CM

## 2024-07-15 DIAGNOSIS — Z3A.33 33 WEEKS GESTATION OF PREGNANCY: Primary | ICD-10-CM

## 2024-07-23 ENCOUNTER — ROUTINE PRENATAL (OUTPATIENT)
Dept: OBGYN CLINIC | Age: 44
End: 2024-07-23

## 2024-07-23 VITALS
WEIGHT: 258 LBS | HEART RATE: 87 BPM | DIASTOLIC BLOOD PRESSURE: 81 MMHG | BODY MASS INDEX: 39.23 KG/M2 | SYSTOLIC BLOOD PRESSURE: 134 MMHG

## 2024-07-23 DIAGNOSIS — O99.891 MATERNAL CONGENITAL CARDIAC ANOMALY COMPLICATING PREGNANCY: ICD-10-CM

## 2024-07-23 DIAGNOSIS — Z87.74 PAST HISTORY OF TETRALOGY OF FALLOT, POST SURGICAL REPAIR: ICD-10-CM

## 2024-07-23 DIAGNOSIS — Q24.9 MATERNAL CONGENITAL CARDIAC ANOMALY COMPLICATING PREGNANCY: ICD-10-CM

## 2024-07-23 DIAGNOSIS — Z11.3 SCREEN FOR STD (SEXUALLY TRANSMITTED DISEASE): ICD-10-CM

## 2024-07-23 DIAGNOSIS — Z3A.34 34 WEEKS GESTATION OF PREGNANCY: Primary | ICD-10-CM

## 2024-07-23 DIAGNOSIS — O09.523 ADVANCED MATERNAL AGE IN MULTIGRAVIDA, THIRD TRIMESTER: ICD-10-CM

## 2024-07-23 DIAGNOSIS — O99.210 OTHER OBESITY AFFECTING PREGNANCY, ANTEPARTUM: ICD-10-CM

## 2024-07-23 DIAGNOSIS — Z36.85 ANTENATAL SCREENING FOR STREPTOCOCCUS B: ICD-10-CM

## 2024-07-23 DIAGNOSIS — E66.8 OTHER OBESITY AFFECTING PREGNANCY, ANTEPARTUM: ICD-10-CM

## 2024-07-23 DIAGNOSIS — O09.522 ADVANCED MATERNAL AGE IN MULTIGRAVIDA, SECOND TRIMESTER: ICD-10-CM

## 2024-07-23 LAB
C TRACH DNA CVX QL NAA+PROBE: NOT DETECTED
N GONORRHOEA DNA CERV MUCUS QL NAA+PROBE: NOT DETECTED
T VAGINALIS DNA GENITAL QL NAA+PROBE: NOT DETECTED

## 2024-07-23 PROCEDURE — 0502F SUBSEQUENT PRENATAL CARE: CPT | Performed by: ADVANCED PRACTICE MIDWIFE

## 2024-07-23 NOTE — PROGRESS NOTES
CNM Prenatal Office Note  Subjective:  Jewels Palomares is here for a return obstetrical visit. Today she is 35w0d weeks EGA.  Pt does feel fetal movement regularly. Denies headaches, RUQ pain, or visual changes as well as contractions, vaginal bleeding or leaking of fluid.     Problems/complaints today:  None  Objective:  Mother's Prenatal Vitals  BP: 134/81  Weight - Scale: 117 kg (258 lb)  Pulse: 87  Patient Position: Sitting  Prenatal Fetal Information  Fundal Height (cm): 35 cm  Fetal HR: 135  Movement: Present  Cervical Exam  Dilation (cm): 1  Dil/Eff/Sta  Dilation (cm): 1  Pt is A&Ox3, in no acute distress. Normocephalic, atraumatic. PERRL. Resp even and non-labored. Skin pink, warm & dry. Gravid abdomen. PRYOR's well. Gait steady.   Assessment:    IUP at 35w0d wks      Diagnosis Orders   1. 34 weeks gestation of pregnancy        2. Grand multipara in labor in third trimester        3. Advanced maternal age in multigravida, third trimester        4. Other obesity affecting pregnancy, antepartum        5. Past history of tetralogy of Fallot, post surgical repair        6. Advanced maternal age in multigravida, second trimester        7. Maternal congenital cardiac anomaly complicating pregnancy        8.  screening for streptococcus B  Strep B DNA probe, amplification      9. Screen for STD (sexually transmitted disease)  Chlamydia, Gonorrhea, Trichomoniasis Swab        Plan:   Problems/Complatins Management Plan:  None  Routine OB Management Plan:  GBS collected and labor education completed. Encourage perineal massage. Discussed pain management options during labor and birth plan. Pt counseled on counseled on balanced nutrition, adequate fluid intake, taking PNV daily, and exercise along with GHTN precautions, Kick count, and  labor  Continue with routine prenatal care.   surveillance indicated for AMA  RTC in 1 wks for prenatal visit    MEDICATIONS:  No orders of the defined types

## 2024-07-24 DIAGNOSIS — Z3A.35 35 WEEKS GESTATION OF PREGNANCY: Primary | ICD-10-CM

## 2024-07-24 DIAGNOSIS — Z3A.35 35 WEEKS GESTATION OF PREGNANCY: ICD-10-CM

## 2024-07-24 LAB — GP B STREP DNA SPEC QL NAA+PROBE: NOT DETECTED

## 2024-07-30 ENCOUNTER — ROUTINE PRENATAL (OUTPATIENT)
Dept: OBGYN CLINIC | Age: 44
End: 2024-07-30

## 2024-07-30 VITALS
SYSTOLIC BLOOD PRESSURE: 134 MMHG | BODY MASS INDEX: 39.38 KG/M2 | DIASTOLIC BLOOD PRESSURE: 80 MMHG | HEART RATE: 99 BPM | WEIGHT: 259 LBS

## 2024-07-30 DIAGNOSIS — O99.210 OTHER OBESITY AFFECTING PREGNANCY, ANTEPARTUM: ICD-10-CM

## 2024-07-30 DIAGNOSIS — O09.523 ADVANCED MATERNAL AGE IN MULTIGRAVIDA, THIRD TRIMESTER: ICD-10-CM

## 2024-07-30 DIAGNOSIS — E66.8 OTHER OBESITY AFFECTING PREGNANCY, ANTEPARTUM: ICD-10-CM

## 2024-07-30 DIAGNOSIS — Z87.74 PAST HISTORY OF TETRALOGY OF FALLOT, POST SURGICAL REPAIR: ICD-10-CM

## 2024-07-30 DIAGNOSIS — O09.522 ADVANCED MATERNAL AGE IN MULTIGRAVIDA, SECOND TRIMESTER: ICD-10-CM

## 2024-07-30 DIAGNOSIS — Z3A.36 36 WEEKS GESTATION OF PREGNANCY: Primary | ICD-10-CM

## 2024-07-30 PROCEDURE — 0502F SUBSEQUENT PRENATAL CARE: CPT | Performed by: ADVANCED PRACTICE MIDWIFE

## 2024-07-30 NOTE — PROGRESS NOTES
[facility] Prenatal Office Note  Subjective:  Jewels Palomares is here for a return obstetrical visit. Today she is 36w0d weeks EGA.  Pt does feel fetal movement regularly. Denies headaches, RUQ pain, or visual changes. Denies vaginal bleeding or leaking of fluid.      Problems/Complaints today:  None  Objective:  Mother's Prenatal Vitals  BP: 134/80  Weight - Scale: 117.5 kg (259 lb)  Pulse: 99  Patient Position: Sitting  Prenatal Fetal Information  Fetal HR: 136-tpg  Movement: Present  Pt is A&Ox3, in no acute distress. Normocephalic, atraumatic. PERRL. Resp even and non-labored. Skin pink, warm & dry. Gravid abdomen. PRYOR's well. Gait steady.   Assessment:    IUP at 36w0d wks      Diagnosis Orders   1. 36 weeks gestation of pregnancy        2. Grand multipara in labor in third trimester        3. Advanced maternal age in multigravida, third trimester        4. Other obesity affecting pregnancy, antepartum        5. Past history of tetralogy of Fallot, post surgical repair        6. Advanced maternal age in multigravida, second trimester          Plan:  Problems/Complaints Management Plan:  None  Routine OB Management Plan:  Pt counseled on balanced nutrition, adequate fluid intake, taking PNV daily, and exercise along with GHTN precautions, Kick count, and  labor  Continue with routine prenatal care.   surveillance indicated for AMA,BMI  RTC in 1 wks for prenatal visit    MEDICATIONS:  No orders of the defined types were placed in this encounter.    ORDERS:  No orders of the defined types were placed in this encounter.      More than 50% of this 20 min visit was education and counseling.        I Vanesa Jordan LPN, am scribing for and in the presence of Shaye Guerrero CNM  24  10:53 AM CDT   I have seen and examined the patient independently.  I reviewed all laboratory and imaging studies that are relevant.  I have reviewed and made any appropriate changes to the HPI.    Electronically signed by

## 2024-07-30 NOTE — PATIENT INSTRUCTIONS
called patient-controlled epidural analgesia (PCEA).  What should you tell your doctor?  Tell your doctor about your health history. Let them know if you or a family member has had problems with anesthesia in the past. You can also talk to the doctor about medical and nonmedical pain relief options for childbirth. Plan for what you want. But be aware that things can change during labor.  Depending on your health conditions, your doctor may want to have an epidural catheter placed early in labor. This would only be used if needed. For example, you may plan to use nonmedical pain relief but then decide later that you want medicines. Or the catheter would be used to give you anesthesia if you need a  () for your or your baby's health and safety.  What are the risks of spinal and epidural pain relief for childbirth?  Serious problems aren't common. Some side effects may happen, such as a headache or nausea. The injection site may be sore. Your heart or breathing can be affected by the medicine. In rare cases, nerve damage can cause long-term numbness, weakness, or pain. Risks to the baby are rare.  Where can you learn more?  Go to https://www.mCASH.net/patientEd and enter C529 to learn more about \"Learning About Spinal and Epidural Pain Relief for Childbirth.\"  Current as of: 2023               Content Version: 14.0   [employer].   Care instructions adapted under license by [employer]. If you have questions about a medical condition or this instruction, always ask your healthcare professional. [employer] [facility] disclaims any warranty or liability for your use of this information.

## 2024-07-31 DIAGNOSIS — Z3A.36 36 WEEKS GESTATION OF PREGNANCY: Primary | ICD-10-CM

## 2024-07-31 DIAGNOSIS — Z3A.36 36 WEEKS GESTATION OF PREGNANCY: ICD-10-CM

## 2024-08-06 ENCOUNTER — ROUTINE PRENATAL (OUTPATIENT)
Dept: OBGYN CLINIC | Age: 44
End: 2024-08-06

## 2024-08-06 VITALS
SYSTOLIC BLOOD PRESSURE: 139 MMHG | WEIGHT: 260 LBS | DIASTOLIC BLOOD PRESSURE: 83 MMHG | BODY MASS INDEX: 39.53 KG/M2 | HEART RATE: 91 BPM

## 2024-08-06 DIAGNOSIS — O99.210 OTHER OBESITY AFFECTING PREGNANCY, ANTEPARTUM: ICD-10-CM

## 2024-08-06 DIAGNOSIS — Z3A.37 37 WEEKS GESTATION OF PREGNANCY: Primary | ICD-10-CM

## 2024-08-06 DIAGNOSIS — E66.8 OTHER OBESITY AFFECTING PREGNANCY, ANTEPARTUM: ICD-10-CM

## 2024-08-06 DIAGNOSIS — Z3A.37 37 WEEKS GESTATION OF PREGNANCY: ICD-10-CM

## 2024-08-06 DIAGNOSIS — O09.523 ADVANCED MATERNAL AGE IN MULTIGRAVIDA, THIRD TRIMESTER: ICD-10-CM

## 2024-08-06 DIAGNOSIS — Z87.74 PAST HISTORY OF TETRALOGY OF FALLOT, POST SURGICAL REPAIR: ICD-10-CM

## 2024-08-06 PROCEDURE — 0502F SUBSEQUENT PRENATAL CARE: CPT | Performed by: ADVANCED PRACTICE MIDWIFE

## 2024-08-06 NOTE — PROGRESS NOTES
[facility] Prenatal Office Note  Subjective:  Jewels Palomares is here for a return obstetrical visit. Today she is 37w0d weeks EGA.  Pt does feel fetal movement regularly. Denies headaches, RUQ pain, or visual changes. Denies vaginal bleeding or leaking of fluid.      Problems/Complaints today:  None  Objective:  Mother's Prenatal Vitals  BP: 139/83  Weight - Scale: 117.9 kg (260 lb)  Pulse: 91  Patient Position: Sitting  Prenatal Fetal Information  Fundal Height (cm): 37 cm  Fetal HR: 131-tpg  Movement: Present  Cervical Exam  Dilation (cm): 1  Dil/Eff/Sta  Dilation (cm): 1  Pt is A&Ox3, in no acute distress. Normocephalic, atraumatic. PERRL. Resp even and non-labored. Skin pink, warm & dry. Gravid abdomen. PRYOR's well. Gait steady.   Assessment:    IUP at 37w0d wks      Diagnosis Orders   1. 37 weeks gestation of pregnancy        2. Grand multipara in labor in third trimester        3. Advanced maternal age in multigravida, third trimester        4. Other obesity affecting pregnancy, antepartum        5. Past history of tetralogy of Fallot, post surgical repair          Plan:  Problems/Complaints Management Plan:  None  Routine OB Management Plan:  Pt counseled on balanced nutrition, adequate fluid intake, taking PNV daily, and exercise along with labor precautions, GHTN precautions, and Kick count  Continue with routine prenatal care.   surveillance indicated for AMA  RTC in 1 wks for prenatal visit    MEDICATIONS:  No orders of the defined types were placed in this encounter.    ORDERS:  No orders of the defined types were placed in this encounter.      More than 50% of this 20 min visit was education and counseling.        I Vanesa Jordan LPN, am scribing for and in the presence of Shaye Guerrero CNM  24  10:54 AM CDT

## 2024-08-07 DIAGNOSIS — Z3A.37 37 WEEKS GESTATION OF PREGNANCY: Primary | ICD-10-CM

## 2024-08-13 ENCOUNTER — ROUTINE PRENATAL (OUTPATIENT)
Dept: OBGYN CLINIC | Age: 44
End: 2024-08-13

## 2024-08-13 VITALS
SYSTOLIC BLOOD PRESSURE: 130 MMHG | DIASTOLIC BLOOD PRESSURE: 86 MMHG | WEIGHT: 261.7 LBS | HEART RATE: 88 BPM | BODY MASS INDEX: 39.79 KG/M2

## 2024-08-13 DIAGNOSIS — O99.210 OTHER OBESITY AFFECTING PREGNANCY, ANTEPARTUM: ICD-10-CM

## 2024-08-13 DIAGNOSIS — Z3A.38 38 WEEKS GESTATION OF PREGNANCY: Primary | ICD-10-CM

## 2024-08-13 DIAGNOSIS — O09.523 ADVANCED MATERNAL AGE IN MULTIGRAVIDA, THIRD TRIMESTER: ICD-10-CM

## 2024-08-13 DIAGNOSIS — E66.8 OTHER OBESITY AFFECTING PREGNANCY, ANTEPARTUM: ICD-10-CM

## 2024-08-13 PROCEDURE — 0502F SUBSEQUENT PRENATAL CARE: CPT | Performed by: NURSE PRACTITIONER

## 2024-08-14 DIAGNOSIS — Z3A.38 38 WEEKS GESTATION OF PREGNANCY: ICD-10-CM

## 2024-08-14 DIAGNOSIS — Z3A.38 38 WEEKS GESTATION OF PREGNANCY: Primary | ICD-10-CM

## 2024-08-15 NOTE — PATIENT INSTRUCTIONS
July 10, 2023               Content Version: 14.0  © 2006-2024 [employer].   Care instructions adapted under license by [employer]. If you have questions about a medical condition or this instruction, always ask your healthcare professional. [employer] disclaims any warranty or liability for your use of this information.

## 2024-08-20 ENCOUNTER — ROUTINE PRENATAL (OUTPATIENT)
Dept: OBGYN CLINIC | Age: 44
End: 2024-08-20

## 2024-08-20 VITALS
WEIGHT: 261 LBS | DIASTOLIC BLOOD PRESSURE: 83 MMHG | BODY MASS INDEX: 39.68 KG/M2 | SYSTOLIC BLOOD PRESSURE: 144 MMHG | HEART RATE: 86 BPM

## 2024-08-20 DIAGNOSIS — O09.523 ADVANCED MATERNAL AGE IN MULTIGRAVIDA, THIRD TRIMESTER: ICD-10-CM

## 2024-08-20 DIAGNOSIS — E66.8 OTHER OBESITY AFFECTING PREGNANCY, ANTEPARTUM: ICD-10-CM

## 2024-08-20 DIAGNOSIS — Z3A.39 39 WEEKS GESTATION OF PREGNANCY: Primary | ICD-10-CM

## 2024-08-20 DIAGNOSIS — Z87.74 PAST HISTORY OF TETRALOGY OF FALLOT, POST SURGICAL REPAIR: ICD-10-CM

## 2024-08-20 DIAGNOSIS — Z3A.39 39 WEEKS GESTATION OF PREGNANCY: ICD-10-CM

## 2024-08-20 DIAGNOSIS — O99.210 OTHER OBESITY AFFECTING PREGNANCY, ANTEPARTUM: ICD-10-CM

## 2024-08-20 PROCEDURE — 0502F SUBSEQUENT PRENATAL CARE: CPT | Performed by: ADVANCED PRACTICE MIDWIFE

## 2024-08-20 NOTE — PROGRESS NOTES
[facility] Prenatal Office Note  Subjective:  Jewels Palomares is here for a return obstetrical visit. Today she is 39w0d weeks EGA.  Pt does feel fetal movement regularly. Denies headaches, RUQ pain, or visual changes. Denies vaginal bleeding or leaking of fluid.      Problems/Complaints today:  Breech  Objective:  Mother's Prenatal Vitals  BP: (!) 144/83  Weight - Scale: 118.4 kg (261 lb)  Pulse: 86  Patient Position: Sitting  Prenatal Fetal Information  Fetal HR: 135-tpg  Movement: Present  Pt is A&Ox3, in no acute distress. Normocephalic, atraumatic. PERRL. Resp even and non-labored. Skin pink, warm & dry. Gravid abdomen. PRYOR's well. Gait steady.   Assessment:    IUP at 39w0d wks      Diagnosis Orders   1. 39 weeks gestation of pregnancy        2. Advanced maternal age in multigravida, third trimester        3. Other obesity affecting pregnancy, antepartum        4. Past history of tetralogy of Fallot, post surgical repair        5. Grand multipara in labor in third trimester          Plan:  Problems/Complaints Management Plan:  Breech presentation today  Routine OB Management Plan:  Pt counseled on balanced nutrition, adequate fluid intake, taking PNV daily, and exercise along with labor precautions, GHTN precautions, and Kick count  Continue with routine prenatal care.   surveillance indicated for AMA  RTC in 6 days for prenatal visit    MEDICATIONS:  No orders of the defined types were placed in this encounter.    ORDERS:  No orders of the defined types were placed in this encounter.      More than 50% of this 20 min visit was education and counseling.      I Vanesa Jordan LPN, am scribing for and in the presence of Shaye Guerrero CNM  24  1:07 PM CDT   I have seen and examined the patient independently.  I reviewed all laboratory and imaging studies that are relevant.  I have reviewed and made any appropriate changes to the HPI.    Electronically signed by DAMON Loya CNM on 24

## 2024-08-22 DIAGNOSIS — O99.210 OTHER OBESITY AFFECTING PREGNANCY, ANTEPARTUM: ICD-10-CM

## 2024-08-22 DIAGNOSIS — O09.523 ADVANCED MATERNAL AGE IN MULTIGRAVIDA, THIRD TRIMESTER: Primary | ICD-10-CM

## 2024-08-22 DIAGNOSIS — E66.8 OTHER OBESITY AFFECTING PREGNANCY, ANTEPARTUM: ICD-10-CM

## 2024-08-23 ENCOUNTER — HOSPITAL ENCOUNTER (OUTPATIENT)
Dept: GENERAL RADIOLOGY | Age: 44
Discharge: HOME | End: 2024-08-23
Payer: COMMERCIAL

## 2024-08-23 ENCOUNTER — HOSPITAL ENCOUNTER (INPATIENT)
Age: 44
LOS: 2 days | Discharge: HOME | End: 2024-08-25
Attending: ADVANCED PRACTICE MIDWIFE | Admitting: ADVANCED PRACTICE MIDWIFE
Payer: COMMERCIAL

## 2024-08-23 DIAGNOSIS — O09.523 ADVANCED MATERNAL AGE IN MULTIGRAVIDA, THIRD TRIMESTER: ICD-10-CM

## 2024-08-23 DIAGNOSIS — O99.210 OTHER OBESITY AFFECTING PREGNANCY, ANTEPARTUM: ICD-10-CM

## 2024-08-23 DIAGNOSIS — E66.8 OTHER OBESITY AFFECTING PREGNANCY, ANTEPARTUM: ICD-10-CM

## 2024-08-23 PROBLEM — Z3A.39 39 WEEKS GESTATION OF PREGNANCY: Status: ACTIVE | Noted: 2024-08-23

## 2024-08-23 PROBLEM — Z95.4 PRESENCE OF OTHER HEART-VALVE REPLACEMENT: Status: ACTIVE | Noted: 2024-08-23

## 2024-08-23 LAB
ABO/RH: NORMAL
ANTIBODY SCREEN: NORMAL
ERYTHROCYTE [DISTWIDTH] IN BLOOD BY AUTOMATED COUNT: 14 % (ref 11.5–14.5)
HCT VFR BLD AUTO: 36.6 % (ref 37–47)
HGB BLD-MCNC: 11.9 G/DL (ref 12–16)
MCH RBC QN AUTO: 27.8 PG (ref 27–31)
MCHC RBC AUTO-ENTMCNC: 32.5 G/DL (ref 33–37)
MCV RBC AUTO: 85.5 FL (ref 81–99)
PLATELET # BLD AUTO: 237 K/UL (ref 130–400)
PMV BLD AUTO: 9.8 FL (ref 9.4–12.3)
RBC # BLD AUTO: 4.28 M/UL (ref 4.2–5.4)
WBC # BLD AUTO: 8.4 K/UL (ref 4.8–10.8)

## 2024-08-23 PROCEDURE — 2580000003 HC RX 258: Performed by: ADVANCED PRACTICE MIDWIFE

## 2024-08-23 PROCEDURE — 36415 COLL VENOUS BLD VENIPUNCTURE: CPT

## 2024-08-23 PROCEDURE — 86901 BLOOD TYPING SEROLOGIC RH(D): CPT

## 2024-08-23 PROCEDURE — 86900 BLOOD TYPING SEROLOGIC ABO: CPT

## 2024-08-23 PROCEDURE — 1220000000 HC SEMI PRIVATE OB R&B

## 2024-08-23 PROCEDURE — 80307 DRUG TEST PRSMV CHEM ANLYZR: CPT

## 2024-08-23 PROCEDURE — 85027 COMPLETE CBC AUTOMATED: CPT

## 2024-08-23 PROCEDURE — G0480 DRUG TEST DEF 1-7 CLASSES: HCPCS

## 2024-08-23 PROCEDURE — 86592 SYPHILIS TEST NON-TREP QUAL: CPT

## 2024-08-23 PROCEDURE — 6360000002 HC RX W HCPCS: Performed by: ADVANCED PRACTICE MIDWIFE

## 2024-08-23 PROCEDURE — 76819 FETAL BIOPHYS PROFIL W/O NST: CPT

## 2024-08-23 PROCEDURE — 86850 RBC ANTIBODY SCREEN: CPT

## 2024-08-23 RX ORDER — ONDANSETRON 2 MG/ML
4 INJECTION INTRAMUSCULAR; INTRAVENOUS EVERY 6 HOURS PRN
Status: DISCONTINUED | OUTPATIENT
Start: 2024-08-23 | End: 2024-08-24

## 2024-08-23 RX ORDER — SODIUM CHLORIDE, SODIUM LACTATE, POTASSIUM CHLORIDE, AND CALCIUM CHLORIDE .6; .31; .03; .02 G/100ML; G/100ML; G/100ML; G/100ML
500 INJECTION, SOLUTION INTRAVENOUS PRN
Status: DISCONTINUED | OUTPATIENT
Start: 2024-08-23 | End: 2024-08-24

## 2024-08-23 RX ORDER — CARBOPROST TROMETHAMINE 250 UG/ML
250 INJECTION, SOLUTION INTRAMUSCULAR PRN
Status: DISCONTINUED | OUTPATIENT
Start: 2024-08-23 | End: 2024-08-25 | Stop reason: HOSPADM

## 2024-08-23 RX ORDER — METHYLERGONOVINE MALEATE 0.2 MG/ML
200 INJECTION INTRAVENOUS PRN
Status: DISCONTINUED | OUTPATIENT
Start: 2024-08-23 | End: 2024-08-25 | Stop reason: HOSPADM

## 2024-08-23 RX ORDER — TRANEXAMIC ACID 10 MG/ML
1000 INJECTION, SOLUTION INTRAVENOUS
Status: ACTIVE | OUTPATIENT
Start: 2024-08-23 | End: 2024-08-24

## 2024-08-23 RX ORDER — SODIUM CHLORIDE 9 MG/ML
25 INJECTION, SOLUTION INTRAVENOUS PRN
Status: DISCONTINUED | OUTPATIENT
Start: 2024-08-23 | End: 2024-08-24

## 2024-08-23 RX ORDER — SODIUM CHLORIDE, SODIUM LACTATE, POTASSIUM CHLORIDE, CALCIUM CHLORIDE 600; 310; 30; 20 MG/100ML; MG/100ML; MG/100ML; MG/100ML
INJECTION, SOLUTION INTRAVENOUS CONTINUOUS
Status: DISCONTINUED | OUTPATIENT
Start: 2024-08-23 | End: 2024-08-25 | Stop reason: HOSPADM

## 2024-08-23 RX ORDER — SODIUM CHLORIDE 0.9 % (FLUSH) 0.9 %
5-40 SYRINGE (ML) INJECTION PRN
Status: DISCONTINUED | OUTPATIENT
Start: 2024-08-23 | End: 2024-08-24

## 2024-08-23 RX ORDER — ONDANSETRON 4 MG/1
4 TABLET, ORALLY DISINTEGRATING ORAL EVERY 6 HOURS PRN
Status: DISCONTINUED | OUTPATIENT
Start: 2024-08-23 | End: 2024-08-24

## 2024-08-23 RX ORDER — SODIUM CHLORIDE 0.9 % (FLUSH) 0.9 %
5-40 SYRINGE (ML) INJECTION EVERY 12 HOURS SCHEDULED
Status: DISCONTINUED | OUTPATIENT
Start: 2024-08-23 | End: 2024-08-24

## 2024-08-23 RX ORDER — MISOPROSTOL 200 UG/1
400 TABLET ORAL PRN
Status: DISCONTINUED | OUTPATIENT
Start: 2024-08-23 | End: 2024-08-25 | Stop reason: HOSPADM

## 2024-08-23 RX ORDER — TERBUTALINE SULFATE 1 MG/ML
0.25 INJECTION, SOLUTION SUBCUTANEOUS
Status: ACTIVE | OUTPATIENT
Start: 2024-08-23 | End: 2024-08-24

## 2024-08-23 RX ADMIN — Medication 1 MILLI-UNITS/MIN: at 23:36

## 2024-08-23 RX ADMIN — SODIUM CHLORIDE, POTASSIUM CHLORIDE, SODIUM LACTATE AND CALCIUM CHLORIDE: 600; 310; 30; 20 INJECTION, SOLUTION INTRAVENOUS at 23:37

## 2024-08-23 SDOH — ECONOMIC STABILITY: INCOME INSECURITY: HOW HARD IS IT FOR YOU TO PAY FOR THE VERY BASICS LIKE FOOD, HOUSING, MEDICAL CARE, AND HEATING?: NOT HARD AT ALL

## 2024-08-23 SDOH — ECONOMIC STABILITY: FOOD INSECURITY: WITHIN THE PAST 12 MONTHS, YOU WORRIED THAT YOUR FOOD WOULD RUN OUT BEFORE YOU GOT MONEY TO BUY MORE.: NEVER TRUE

## 2024-08-23 SDOH — ECONOMIC STABILITY: INCOME INSECURITY: IN THE PAST 12 MONTHS, HAS THE ELECTRIC, GAS, OIL, OR WATER COMPANY THREATENED TO SHUT OFF SERVICE IN YOUR HOME?: NO

## 2024-08-23 ASSESSMENT — PATIENT HEALTH QUESTIONNAIRE - PHQ9
2. FEELING DOWN, DEPRESSED OR HOPELESS: NOT AT ALL
SUM OF ALL RESPONSES TO PHQ9 QUESTIONS 1 & 2: 0
1. LITTLE INTEREST OR PLEASURE IN DOING THINGS: NOT AT ALL
SUM OF ALL RESPONSES TO PHQ QUESTIONS 1-9: 0

## 2024-08-24 ENCOUNTER — ANESTHESIA EVENT (OUTPATIENT)
Dept: LABOR AND DELIVERY | Age: 44
End: 2024-08-24
Payer: COMMERCIAL

## 2024-08-24 ENCOUNTER — ANESTHESIA (OUTPATIENT)
Dept: LABOR AND DELIVERY | Age: 44
End: 2024-08-24
Payer: COMMERCIAL

## 2024-08-24 LAB
AMPHET UR QL SCN: NEGATIVE
BARBITURATES UR QL SCN: NEGATIVE
BENZODIAZ UR QL SCN: NEGATIVE
BUPRENORPHINE URINE: NEGATIVE
CANNABINOIDS UR QL SCN: NEGATIVE
COCAINE UR QL SCN: NEGATIVE
DRUG SCREEN COMMENT UR-IMP: NORMAL
ERYTHROCYTE [DISTWIDTH] IN BLOOD BY AUTOMATED COUNT: 13.9 % (ref 11.5–14.5)
FENTANYL SCREEN, URINE: NEGATIVE
HCT VFR BLD AUTO: 32.8 % (ref 37–47)
HGB BLD-MCNC: 10.7 G/DL (ref 12–16)
MCH RBC QN AUTO: 29.1 PG (ref 27–31)
MCHC RBC AUTO-ENTMCNC: 32.6 G/DL (ref 33–37)
MCV RBC AUTO: 89.1 FL (ref 81–99)
METHADONE UR QL SCN: NEGATIVE
METHAMPHETAMINE, URINE: NEGATIVE
OPIATES UR QL SCN: NEGATIVE
OXYCODONE UR QL SCN: NEGATIVE
PCP UR QL SCN: NEGATIVE
PLATELET # BLD AUTO: 212 K/UL (ref 130–400)
PMV BLD AUTO: 10 FL (ref 9.4–12.3)
RBC # BLD AUTO: 3.68 M/UL (ref 4.2–5.4)
TRICYCLIC ANTIDEPRESSANTS, UR: NEGATIVE
WBC # BLD AUTO: 11.2 K/UL (ref 4.8–10.8)

## 2024-08-24 PROCEDURE — 2580000003 HC RX 258: Performed by: ADVANCED PRACTICE MIDWIFE

## 2024-08-24 PROCEDURE — 3700000025 EPIDURAL BLOCK: Performed by: NURSE ANESTHETIST, CERTIFIED REGISTERED

## 2024-08-24 PROCEDURE — 2500000003 HC RX 250 WO HCPCS: Performed by: NURSE ANESTHETIST, CERTIFIED REGISTERED

## 2024-08-24 PROCEDURE — 85027 COMPLETE CBC AUTOMATED: CPT

## 2024-08-24 PROCEDURE — 6360000002 HC RX W HCPCS: Performed by: ADVANCED PRACTICE MIDWIFE

## 2024-08-24 PROCEDURE — 6360000002 HC RX W HCPCS: Performed by: NURSE ANESTHETIST, CERTIFIED REGISTERED

## 2024-08-24 PROCEDURE — 7200000001 HC VAGINAL DELIVERY

## 2024-08-24 PROCEDURE — 1220000000 HC SEMI PRIVATE OB R&B

## 2024-08-24 PROCEDURE — 36415 COLL VENOUS BLD VENIPUNCTURE: CPT

## 2024-08-24 PROCEDURE — 6370000000 HC RX 637 (ALT 250 FOR IP): Performed by: ADVANCED PRACTICE MIDWIFE

## 2024-08-24 RX ORDER — KETOROLAC TROMETHAMINE 30 MG/ML
30 INJECTION, SOLUTION INTRAMUSCULAR; INTRAVENOUS ONCE
Status: DISCONTINUED | OUTPATIENT
Start: 2024-08-24 | End: 2024-08-24

## 2024-08-24 RX ORDER — NALOXONE HYDROCHLORIDE 0.4 MG/ML
INJECTION, SOLUTION INTRAMUSCULAR; INTRAVENOUS; SUBCUTANEOUS PRN
Status: DISCONTINUED | OUTPATIENT
Start: 2024-08-24 | End: 2024-08-24 | Stop reason: HOSPADM

## 2024-08-24 RX ORDER — SODIUM CHLORIDE 0.9 % (FLUSH) 0.9 %
5-40 SYRINGE (ML) INJECTION EVERY 12 HOURS SCHEDULED
Status: DISCONTINUED | OUTPATIENT
Start: 2024-08-24 | End: 2024-08-25

## 2024-08-24 RX ORDER — EPHEDRINE SULFATE/0.9% NACL/PF 25 MG/5 ML
10 SYRINGE (ML) INTRAVENOUS PRN
Status: DISCONTINUED | OUTPATIENT
Start: 2024-08-24 | End: 2024-08-24 | Stop reason: HOSPADM

## 2024-08-24 RX ORDER — SODIUM CHLORIDE 0.9 % (FLUSH) 0.9 %
5-40 SYRINGE (ML) INJECTION PRN
Status: DISCONTINUED | OUTPATIENT
Start: 2024-08-24 | End: 2024-08-25 | Stop reason: HOSPADM

## 2024-08-24 RX ORDER — ONDANSETRON 2 MG/ML
4 INJECTION INTRAMUSCULAR; INTRAVENOUS EVERY 6 HOURS PRN
Status: DISCONTINUED | OUTPATIENT
Start: 2024-08-24 | End: 2024-08-25 | Stop reason: HOSPADM

## 2024-08-24 RX ORDER — EPHEDRINE SULFATE/0.9% NACL/PF 25 MG/5 ML
5 SYRINGE (ML) INTRAVENOUS PRN
Status: DISCONTINUED | OUTPATIENT
Start: 2024-08-26 | End: 2024-08-24 | Stop reason: HOSPADM

## 2024-08-24 RX ORDER — IBUPROFEN 400 MG/1
800 TABLET, FILM COATED ORAL EVERY 8 HOURS
Status: DISCONTINUED | OUTPATIENT
Start: 2024-08-24 | End: 2024-08-25 | Stop reason: HOSPADM

## 2024-08-24 RX ORDER — DOCUSATE SODIUM 100 MG/1
100 CAPSULE, LIQUID FILLED ORAL 2 TIMES DAILY
Status: DISCONTINUED | OUTPATIENT
Start: 2024-08-24 | End: 2024-08-25 | Stop reason: HOSPADM

## 2024-08-24 RX ORDER — ROPIVACAINE HYDROCHLORIDE 2 MG/ML
INJECTION, SOLUTION EPIDURAL; INFILTRATION; PERINEURAL PRN
Status: DISCONTINUED | OUTPATIENT
Start: 2024-08-24 | End: 2024-08-24 | Stop reason: SDUPTHER

## 2024-08-24 RX ORDER — DEXMEDETOMIDINE HYDROCHLORIDE 100 UG/ML
INJECTION, SOLUTION INTRAVENOUS PRN
Status: DISCONTINUED | OUTPATIENT
Start: 2024-08-24 | End: 2024-08-24 | Stop reason: SDUPTHER

## 2024-08-24 RX ORDER — ACETAMINOPHEN 500 MG
1000 TABLET ORAL EVERY 8 HOURS
Status: DISCONTINUED | OUTPATIENT
Start: 2024-08-24 | End: 2024-08-25 | Stop reason: HOSPADM

## 2024-08-24 RX ORDER — ONDANSETRON 4 MG/1
4 TABLET, ORALLY DISINTEGRATING ORAL EVERY 6 HOURS PRN
Status: DISCONTINUED | OUTPATIENT
Start: 2024-08-24 | End: 2024-08-25 | Stop reason: HOSPADM

## 2024-08-24 RX ORDER — KETOROLAC TROMETHAMINE 30 MG/ML
30 INJECTION, SOLUTION INTRAMUSCULAR; INTRAVENOUS EVERY 6 HOURS PRN
Status: DISCONTINUED | OUTPATIENT
Start: 2024-08-24 | End: 2024-08-24

## 2024-08-24 RX ORDER — SODIUM CHLORIDE 9 MG/ML
INJECTION, SOLUTION INTRAVENOUS PRN
Status: DISCONTINUED | OUTPATIENT
Start: 2024-08-24 | End: 2024-08-25 | Stop reason: HOSPADM

## 2024-08-24 RX ADMIN — DOCUSATE SODIUM 100 MG: 100 CAPSULE, LIQUID FILLED ORAL at 13:31

## 2024-08-24 RX ADMIN — WATER 2000 MG: 1 INJECTION INTRAMUSCULAR; INTRAVENOUS; SUBCUTANEOUS at 08:35

## 2024-08-24 RX ADMIN — KETOROLAC TROMETHAMINE 30 MG: 30 INJECTION, SOLUTION INTRAMUSCULAR at 09:03

## 2024-08-24 RX ADMIN — ROPIVACAINE HYDROCHLORIDE 1 ML: 2 INJECTION, SOLUTION EPIDURAL; INFILTRATION at 08:15

## 2024-08-24 RX ADMIN — DEXMEDETOMIDINE HYDROCHLORIDE 1.5 MCG: 100 INJECTION, SOLUTION, CONCENTRATE INTRAVENOUS at 08:20

## 2024-08-24 RX ADMIN — DEXMEDETOMIDINE HYDROCHLORIDE 1.5 MCG: 100 INJECTION, SOLUTION, CONCENTRATE INTRAVENOUS at 08:15

## 2024-08-24 RX ADMIN — DEXMEDETOMIDINE HYDROCHLORIDE 1.5 MCG: 100 INJECTION, SOLUTION, CONCENTRATE INTRAVENOUS at 08:25

## 2024-08-24 RX ADMIN — ROPIVACAINE HYDROCHLORIDE 1 ML: 2 INJECTION, SOLUTION EPIDURAL; INFILTRATION at 08:20

## 2024-08-24 RX ADMIN — ROPIVACAINE HYDROCHLORIDE 1 ML: 2 INJECTION, SOLUTION EPIDURAL; INFILTRATION at 08:25

## 2024-08-24 RX ADMIN — DEXMEDETOMIDINE HYDROCHLORIDE 1.5 MCG: 100 INJECTION, SOLUTION, CONCENTRATE INTRAVENOUS at 08:30

## 2024-08-24 RX ADMIN — ROPIVACAINE HYDROCHLORIDE 1 ML: 2 INJECTION, SOLUTION EPIDURAL; INFILTRATION at 08:30

## 2024-08-24 ASSESSMENT — PAIN DESCRIPTION - DESCRIPTORS
DESCRIPTORS: SORE;BURNING
DESCRIPTORS: SORE;BURNING

## 2024-08-24 ASSESSMENT — PAIN SCALES - GENERAL
PAINLEVEL_OUTOF10: 3
PAINLEVEL_OUTOF10: 3

## 2024-08-24 ASSESSMENT — PAIN DESCRIPTION - LOCATION
LOCATION: PERINEUM
LOCATION: PERINEUM

## 2024-08-24 NOTE — ANESTHESIA PRE PROCEDURE
Department of Anesthesiology  Preprocedure Note       Name:  Jewels Palomares   Age:  43 y.o.  :  1980                                          MRN:  [id number]         Date:  2024      Surgeon: * No surgeons listed *    Procedure: * No procedures listed *    Medications prior to admission:   Prior to Admission medications    Medication Sig Start Date End Date Taking? Authorizing Provider   GARLIC PO Take by mouth   Yes Nasrin Luque MD   Desiccated Beef Liver POWD by Does not apply route   Yes Nasrin Luque MD   ZINC PO Take by mouth   Yes Nasrin Luque MD   coenzyme Q10 100 MG CAPS capsule Take 1 capsule by mouth daily   Yes Nasrin Luque MD   Multiple Vitamin (MULTI-VITAMIN DAILY PO) Take 1 tablet by mouth daily   Yes Nasrin Luque MD   Omega-3 Fatty Acids (FISH OIL PO) Take 1 capsule by mouth daily   Yes Nasrin Luque MD   potassium chloride (KLOR-CON M) 10 MEQ extended release tablet Take 1 tablet by mouth daily 10/1/20  Yes Nasrin Luque MD       Current medications:    Current Facility-Administered Medications   Medication Dose Route Frequency Provider Last Rate Last Admin    ketorolac (TORADOL) injection 30 mg  30 mg IntraVENous Q6H PRN Shaye Guerrero APRN - CNM   30 mg at 24 0903    ketorolac (TORADOL) injection 30 mg  30 mg IntraMUSCular Q6H PRN Shaye Guerrero APRN - [facility]        naloxone 0.4 mg in 10 mL sodium chloride syringe   IntraVENous PRN Shakila Chandra APRN - CRNA        ePHEDrine injection 10 mg  10 mg IntraVENous PRN Shakila Chandra APRN - CRNA        Followed by    [START ON 2024] ePHEDrine injection 5 mg  5 mg IntraVENous PRN Shakila Chandra APRN - CRNA        sodium chloride flush 0.9 % injection 5-40 mL  5-40 mL IntraVENous 2 times per day Shaye Guerrero APRN - CNM        sodium chloride flush 0.9 % injection 5-40 mL  5-40 mL IntraVENous PRN Shaye Guerrero APRN - CNM        0.9 % sodium chloride

## 2024-08-24 NOTE — ANESTHESIA PROCEDURE NOTES
Epidural Block    Patient location during procedure: OB  Start time: 8/24/2024 8:01 AM  End time: 8/24/2024 8:12 AM  Reason for block: labor epidural  Staffing  Performed: resident/CRNA   Resident/CRNA: Shakila Chandra APRN - CRNA  Performed by: Shakila Chandra APRN - CRNA  Authorized by: Shakila Chandra APRN - CRNA    Epidural  Patient position: sitting  Prep: Betadine  Patient monitoring: continuous pulse ox and frequent blood pressure checks  Approach: midline  Location: L3-4  Injection technique: KRIS saline  Guidance: landmark technique  Provider prep: mask and sterile gloves  Needle  Needle type: Tuohy   Needle gauge: 17 G  Needle length: 3.5 in  Needle insertion depth: 8.5 cm  Catheter type: end hole  Catheter size: 19 G  Catheter at skin depth: 13 cm  Test dose: negativeCatheter Secured: tegaderm and tape  Assessment  Sensory level: T8  Hemodynamics: stable  Attempts: 1  Outcomes: uncomplicated and patient tolerated procedure well  Additional Notes  Pt in sitting position with knees against back of bed, with baby between legs. Arms around pillow, curled around baby, OB rn in front of pt for support. Chin to chest. Hand hygiene completed. Sterile gloves applied. Field prepped with betadine with time allotted to dry. Drape utilized, sterile technique maintained. 3ml 1% lidocaine to create skinwheel at L3-L4 interspace. X1 attempt, KRIS at depth noted, catheter threaded easily and without resistance to depth noted. - paresthesia, -heme, -csf. Test dose negative for perioral paresthesia or tinnitus. Boluses as noted in MAR. Clear occlusive dressings, silk tape outlining dressings. Continuous epidural pump as noted in MAR. Pain level upon room entry: 10/10, pt unable to verbalize number after placement and boluses, though said was 'slightly improved'.    In room: 0751  Timeout: prior to 0801  Betadine prep: 0801 (3 mins allotted to dry)  Skin wheel 3ml PF 1% lidocaine: 0804  Needle in: 0805  Catheter

## 2024-08-24 NOTE — H&P
[facility]    OB History and Physical    Provider: DAMON Loya CNM  Date: 2024            11:33 PM    Patient Name: Jewels Palomares  Patient : 1980  MRN: [id number]   Room/Bed: [facility]0218-    SUBJECTIVE:    CHIEF COMPLAINT:  IOL at term, AMA status  Chief Complaint   Patient presents with    Scheduled Induction       HISTORY OF PRESENT ILLNESS:      Jewels Mcclain a 43 y.o. female at 39w3d presents with a chief complaint as above and is being admitted for induction. She has been co-managed by [facility] who recommended delivery between 39/0-39/6 weeks for AMA status, obesity, and hx of cardiac valve replacement. She has also been followed by her cardiologist in [place]. She has been cleared for  and was advised to have IV antibiotics during second stage of labor. Her baby has been breech but confirmed vertex today.     Contractions: Yes  Rupture of membranes: No  Vaginal bleeding: No    REVIEW OF SYSTEMS:  A comprehensive review of systems was negative except for what was noted in the HPI.     OBJECTIVE:     Estimated Due Date:   Estimated Date of Delivery: 24   Patient's last menstrual period was 2023.      PREGNANCY RISK FACTORS:       Patient Active Problem List   Diagnosis    Past history of tetralogy of Fallot, post surgical repair    Tetralogy of Fallot    Maternal congenital cardiac anomaly complicating pregnancy    Obesity complicating pregnancy, childbirth, or puerperium, antepartum    Grand multipara in labor in third trimester    Advanced maternal age in multigravida, third trimester    39 weeks gestation of pregnancy    Presence of other heart-valve replacement        Steroids:  no    PAST OB HISTORY:  OB History    Para Term  AB Living   6 5 5 0 0 5   SAB IAB Ectopic Molar Multiple Live Births   0 0 0 0 0 0      # Outcome Date GA Lbr Eulogio/2nd Weight Sex Type Anes PTL Lv   6 Current            5 Term 18 40w3d  4.026 kg (8 lb 14  swimming, biking, or other similar activites)?: 0     Number of minutes of exercise per week:: 0   Stress: Not on file   Social Connections: Socially Integrated (8/23/2024)    Social Connections ([facility] HRSN)     If for any reason you need help with day-to-day activities such as bathing, preparing meals, shopping, managing finances, etc., do you get the help you need?: I don't need any help   Intimate Partner Violence: Unknown (10/11/2023)    Received from [facility]    Abuse Screen     Unsafe at Home or Work/School: Not on file     Feels Threatened by Someone?: Not on file     Does Anyone Keep You from Contacting Others or Doint Things Outside the Home?: Not on file     Physical Sign of Abuse Present: Not on file   Housing Stability: Low Risk  (8/23/2024)    Housing Stability Vital Sign     Unable to Pay for Housing in the Last Year: No     Number of Times Moved in the Last Year: 1     Homeless in the Last Year: No       Family History:       Problem Relation Age of Onset    Heart Disease Paternal Grandfather     Heart Disease Maternal Grandmother     Diabetes Father        Medications Prior to Admission:  Medications Prior to Admission: GARLIC PO, Take by mouth  Desiccated Beef Liver POWD, by Does not apply route  ZINC PO, Take by mouth  coenzyme Q10 100 MG CAPS capsule, Take 1 capsule by mouth daily  Multiple Vitamin (MULTI-VITAMIN DAILY PO), Take 1 tablet by mouth daily  Omega-3 Fatty Acids (FISH OIL PO), Take 1 capsule by mouth daily  potassium chloride (KLOR-CON M) 10 MEQ extended release tablet, Take 1 tablet by mouth daily    PHYSICAL EXAM:     Vitals:    08/23/24 2144   Temp: 97 °F (36.1 °C)       General appearance:  awake, alert, cooperative, no apparent distress, and appears stated age  Skin:  Warm, dry, no rashes or erythema  Neurologic:  Awake, alert, oriented to name, place and time.  Cranial nerves II-XII are grossly intact.  Motor is 5 out of 5 bilaterally.  Lungs:  No increased work of

## 2024-08-24 NOTE — CONSULTS
Contacted at 0736 by patient primary OB RN that pt was requesting epidural. Called RN to verify that fluid bolus was started, informed her of importance of preload in relation to pt heart condition. Arrived at bedside at 0751. Immediately began moving pt out of bed and into position on side of bed, cleaning off bedside table, education pt regarding positioning and importance of refraining from moving during procedure. Spoke with patient regarding her pmhx last evening, reviewed risks and benefits of procedure prior to procedure start time at 0801.

## 2024-08-24 NOTE — PROGRESS NOTES
Patient arrived ambulatory to unit with family for scheduled induction. Placed of EFM. Abdomen soft and nontender. States positive fetal movement. Denies leakage of fluid and vaginal bleeding. SVE, see flowsheets.

## 2024-08-25 VITALS
SYSTOLIC BLOOD PRESSURE: 128 MMHG | DIASTOLIC BLOOD PRESSURE: 86 MMHG | HEART RATE: 71 BPM | RESPIRATION RATE: 16 BRPM | OXYGEN SATURATION: 97 % | TEMPERATURE: 97.7 F

## 2024-08-25 PROCEDURE — 59400 OBSTETRICAL CARE: CPT | Performed by: ADVANCED PRACTICE MIDWIFE

## 2024-08-25 PROCEDURE — 6370000000 HC RX 637 (ALT 250 FOR IP): Performed by: ADVANCED PRACTICE MIDWIFE

## 2024-08-25 RX ORDER — IBUPROFEN 800 MG/1
800 TABLET, FILM COATED ORAL EVERY 8 HOURS
Qty: 120 TABLET | Refills: 3 | Status: SHIPPED | OUTPATIENT
Start: 2024-08-25

## 2024-08-25 RX ADMIN — DOCUSATE SODIUM 100 MG: 100 CAPSULE, LIQUID FILLED ORAL at 09:05

## 2024-08-25 NOTE — DISCHARGE INSTRUCTIONS
day and night.  May apply ice packs for 20 minutes 4 times daily.  Avoid breast stimulation.  Avoid heat, especially when showering. This will encourage milk letdown.  2. ENGORGEMENT, NURSING MOMS:  Engorgement usually lasts 1-2 days until breasts adjust to babies needs.  3. NIPPLE CARE, NURSING MOMS:  Cleanse nipples with water only. Soap has a drying effect.   4. DO SELF-BREAST EXAMS MONTHLY.    Urine Patterns:  Report inability to urinate, frequency, burning, or urgency to the doctor.    Bowel Patterns:  1. You should have a normal stool by 2nd or 3rd day after birth.  2. Drinking fluids, walking, and eating roughage help promote regular stools.    Hemorrhoid Care:  1. Take 20-minute sitz bath 3-4 times daily.  2. Use TUCKS.  3. Maintain adequate fluid intake.  4. Avoid prolonged sitting.    Hygiene:  1. Apply suhas pads from front to back.  2. Always clean from front to back.  3. Shower daily.    Nutrition/Fluids:  1. A weight loss of 10-12 pounds after birth is normal.  2. It may take from 4 weeks to several months to reach your pre-pregnant state.  3. After weight loss stabilizes, weight loss should not exceed a pound per week.    Rest/Sleep:  1. Rest as much as possible.  2. Your first week at home should consist of taking care of yourself and the baby only.    Activity:  1. Limit the following activities for 6 weeks.  Heavy lifting ,nothing heavier than the baby for first few weeks  No douching, tampons or intercourse for 6 weeks.   Wearing constrictive garments around abdomen or legs.  2. May drive in 1-2 weeks or as directed by doctor.   3. Start past-partum exercises.  4. Increased discharge or pain may mean you need to decrease activities.    Emotions:  You may become depressed, let down or have mood swings and feel weepy for a couple of weeks. This is due to hormonal changes, fatigue, discomfort and over-stimulation, which is normal. Many people refer to this as the baby blues. This should resolve in a  and those who care about you. Tell them about your struggle.  Join a support group of new parents. No one can better understand the challenges of caring for a  than other new parents.

## 2024-08-25 NOTE — DISCHARGE SUMMARY
[facility]   Discharge Summary    Patient Name: Jewels Palomares  Patient : 1980  Room/Bed: 0215/0215-01  Primary Care Physician: No primary care provider on file.  Admit Date: 2024  8:59 PM    Reasons for Admission on 2024  8:59 PM  39 weeks gestation of pregnancy [Z3A.39]  No comment available  Induction of Labor    Patient Active Problem List   Diagnosis    Past history of tetralogy of Fallot, post surgical repair    Tetralogy of Fallot    Maternal congenital cardiac anomaly complicating pregnancy    Obesity complicating pregnancy, childbirth, or puerperium, antepartum     (normal spontaneous vaginal delivery)    Advanced maternal age in multigravida, third trimester    39 weeks gestation of pregnancy    Presence of other heart-valve replacement       Jewels Palomares is a 43 y.o. year old  who presented at 39w4d gestation with Scheduled Induction  . Her pregnancy has been complicated by AMA, obesity, hx of tetrology of fallot and cardiac valve replacement, unstable lie with fetus. She presented at her last office visit and baby had positioned to breech. Recheck Friday revealed baby had repositioned to vertex. She was admitted for IOL.     Please see H&P for detailed information.     She was admitted and progressed in labor with pitocin for induction and Epidural anesthesia to completely dilated and effaced.  No Immediate complications were encountered.   Please see procedure note for detailed information.     Her postpartum course has been unremarkable. See H&H history below. She had no signs or symptoms of acute blood loss anemia. She is ambulating well, voiding without difficulty and her lochia is within normal limits. She is feeding by breast without difficulty. She was stable for discharge on postpartum day 1.     Hemoglobin   Date Value Ref Range Status   2024 10.7 (L) 12.0 - 16.0 g/dL Final   2024 11.9 (L) 12.0 - 16.0 g/dL Final     Hematocrit

## 2024-08-25 NOTE — PROGRESS NOTES
Post anesthesia labor epidural follow up. Pt resting comfortably in bed with baby, family at bedside. Pt reports mild generalized back soreness, aching, rates 2/10. Epidural puncture site CDI. No edema or erythema noted. Denies headache, N/V, photophobia, paresthesia.

## 2024-08-26 ENCOUNTER — LACTATION ENCOUNTER (OUTPATIENT)
Dept: LABOR AND DELIVERY | Age: 44
End: 2024-08-26

## 2024-08-26 PROBLEM — Z3A.39 39 WEEKS GESTATION OF PREGNANCY: Status: RESOLVED | Noted: 2024-08-23 | Resolved: 2024-08-26

## 2024-08-26 LAB — RPR SER QL: NORMAL

## 2024-08-26 NOTE — LACTATION NOTE
This note was copied from a baby's chart.  This is to inform you that baby has been seen since discharge    Day of Life: 2    : 24 @ 0833    GA: 39.4    Mom's blood type: O-    Baby's blood type: O- MAEVE-    Birth weight: 7-4.8 lb (3310g)    Discharge weight: 7-0.9 lb )3200g)    Today's weight  Pre-feeding weight without diaper: 6-8.0 lb (2940g)  Pre-feeding weight with diaper: 6-8.5 lb (2950g)    Post-feeding weight with diaper: 6-9.0 lb (2970g) transferred 20 grams/ml off the right breast, for about 10-15 mins  Post-feeding weight with diaper: 6-9.0 lb (2975g) transferred 5 grams/ml off the left breast in about 10 mins    Total transfer amount: 25 grams/ml    A 2 day old should transfer 5-15 grams/ml    Weight loss: -11.18%    Bilizap: (draw serum if within 3 mg/dl of phototherapy on graph): 8.9    Infant feeding (type and how often in the last 24 hours): breastfeeding on demand or at least every 3 hours for about 15-20 mins. No    Stools (in the last 24 hours): 6    Voids (in the last 24 hours): 4    Color: pink  Gums: moist  Skin: warm/dry  Cord: dry  Circumcision: n/a  Fontanels: soft/flat  Activity: alert/active        Electric breast pump: yes and a hand held pump  Breastfeeding history: yes, longest duration 21 months    Mother independently positioned and latched baby to both breast, baby transferred 25 grams/ml in about 25 mins.  Instructed mother to continue to breastfeed every 2- 3 hours for 15-20 mins each side or on demand watching for hunger cues and using waking techniques when needed. 8-12 feedings in 24 hours being the goal. Hand expression and breast compressions encouraged to increase milk supply and transfer. Discussed the benefits of colostrum, skin to skin and the importance of good positioning and latch. Discussed supply and demand. Breastfeeding book given. Instructions and handouts given over management of sore nipples, engorgement, plugged ducts, mastitis, hydration, nutrition,  and medications that could effect milk supply. Mother knows when to call MD if needed.  Jaundice precautions discussed, mother knows to bring baby back for evaluation sooner if needed, if whites of baby's eyes become yellow, difficulty with waking baby for feedings and no stools. Instructed to place baby were baby can get indirect sunlight, to help eliminate jaundice. Reminded mother to increase feedings, the more baby eats the more baby poops. Mother verbalizes understanding.  Lactation number and hours provided. Mother knows she can call and make appointment for pre and post feeding weights whenever needed or can call with questions or concerns her entire breastfeeding journey. All questions at this time answered. Support and Encouragement given.     Instructions to mother: to return in 2 days for pre and post feeding weight check

## 2024-08-26 NOTE — LACTATION NOTE
This note was copied from a baby's chart.  Infant here today for weight check and outpt hearing screen, 2nd one.  Infant passed hearing screen bilaterally.

## 2024-08-26 NOTE — L&D DELIVERY NOTE
Anju Palomares [[id number]]      Labor Events     Labor: No   Steroids: None  Cervical Ripening Date/Time:        Rupture Date/Time:  24 23:12:00   Rupture Type: AROM, Intact  Fluid Color: Clear  Fluid Odor: None  Fluid Volume: Moderate  Induction: Oxytocin  Augmentation: AROM  Labor Complications: None       Anesthesia    Method: Epidural       Labor Event Times      Labor onset date/time:        Dilation complete date/time:  24 08:28:00     Start pushing date/time:  2024 08:30:00   Decision date/time (emergent ):            Labor Length    2nd stage: 0h 05m  3rd stage: 0h 03m       Delivery Details      Delivery Date: 24 Delivery Time: 08:33:00   Delivery Type: Vaginal, Spontaneous              Shoulder Dystocia    Shoulder Dystocia Present?: No       Assisted Delivery Details    Forceps Attempted?: No  Vacuum Extractor Attempted?: No                           Cord    Vessels: 3 Vessels  Complications: Nuchal Loose  Cord Around: Head  Delayed Cord Clamping?: Yes  Cord Blood Disposition: Lab  Gases Sent?: No              Placenta    Date/Time: 2024 08:36:00  Removal: Spontaneous  Appearance: Intact  Disposition: Placenta Refrigerator       Lacerations    Episiotomy: None  Perineal Lacerations: None  Other Lacerations: no non-perineal laceration       Blood Loss  Mother: Jewels Palomares #[id number]     Start of Mother's Information      Delivery Blood Loss  24 - 24 0833      Quantitative Blood Loss (mL) Hospital Encounter 491 grams    Total  491 mL               End of Mother's Information  Mother: Jewels Palomares #[id number]                Delivery Providers    Delivering clinician: Shaye Guerrero APRN - CNM     Provider Role     Obstetrician    Esthela Lewis, RN Primary Nurse    Alley Cullen RN Primary [place] Nurse     NICU Nurse     Neonatologist     Anesthesiologist     Nurse Anesthetist     Nurse Practitioner    Shaye Guerrero APRN -

## 2024-08-27 ENCOUNTER — TELEPHONE (OUTPATIENT)
Dept: OBGYN CLINIC | Age: 44
End: 2024-08-27

## 2024-08-27 NOTE — TELEPHONE ENCOUNTER
Jewels called to schedule a  2 wk pp . Requesting 9/9 or 9/10 in the morning or mid  day    Please be advised that the best time to call her to accommodate their needs is Anytime.     Thank you.

## 2024-08-28 ENCOUNTER — LACTATION ENCOUNTER (OUTPATIENT)
Dept: LABOR AND DELIVERY | Age: 44
End: 2024-08-28

## 2024-08-28 NOTE — LACTATION NOTE
This note was copied from a baby's chart.  This is to inform you that baby has been seen since discharge    Day of Life: 4    : 24 @ 0833    GA: 39.4    Mom's blood type: O-    Baby's blood type: O- MAEVE-    Birth weight: 7-4.8 lb (3310g)    Discharge weight: 7-0.9 lb )3200g)    24: 6-8.0 lb (2940g)    Today's weight: 6-8.5 lb (2970g)    Weight loss: -10.28%    Bilizap: (draw serum if within 3 mg/dl of phototherapy on graph): 11.0    Infant feeding (type and how often in the last 24 hours): breastfeeding on demand or at least every 3 hours for about 20-30 mins. Not pumping, no bottle  Mother states her milk just came in, yesterday afternoon.    Stools (in the last 24 hours): 3    Voids (in the last 24 hours): 5    Color: pink  Gums: moist  Skin: warm/dry  Cord: dry  Circumcision: n/a  Fontanels: soft/flat  Activity: alert/active        Electric breast pump: yes and a hand held pump  Breastfeeding history: yes, longest duration 21 months    Mother independently positioned and latched baby to both breast, baby transferred 25 grams/ml in about 25 mins.  Instructed mother to continue to breastfeed every 2- 3 hours for 15-20 mins each side or on demand watching for hunger cues and using waking techniques when needed. 8-12 feedings in 24 hours being the goal. Hand expression and breast compressions encouraged to increase milk supply and transfer. Discussed the benefits of colostrum, skin to skin and the importance of good positioning and latch. Discussed supply and demand. Breastfeeding book given. Instructions and handouts given over management of sore nipples, engorgement, plugged ducts, mastitis, hydration, nutrition, and medications that could effect milk supply. Mother knows when to call MD if needed.  Jaundice precautions discussed, mother knows to bring baby back for evaluation sooner if needed, if whites of baby's eyes become yellow, difficulty with waking baby for feedings and no stools.

## 2024-08-29 ENCOUNTER — LACTATION ENCOUNTER (OUTPATIENT)
Dept: LABOR AND DELIVERY | Age: 44
End: 2024-08-29

## 2024-08-29 NOTE — LACTATION NOTE
This note was copied from a baby's chart.  This is to inform you that baby has been seen since discharge    Day of Life: 5    : 24 @ 0833    GA: 39.4    Mom's blood type: O-    Baby's blood type: O- MAEVE-    Birth weight: 7-4.8 lb (3310g)    Discharge weight: 7-0.9 lb )3200g)    24: 6-8.0 lb (2940g)    24:  6-8.5 lb (2970g)    Today's weight: 6-10.5 lb (3025g)    Weight loss: -8.61%    Bilizap: (draw serum if within 3 mg/dl of phototherapy on graph): 11.5    Infant feeding (type and how often in the last 24 hours): breastfeeding on demand or at least every 3 hours for about 20-30 mins. Pumped once obtained, unsure of the amount, fed baby 21 ml      Stools (in the last 24 hours): 3    Voids (in the last 24 hours): 5    Color: pink  Gums: moist  Skin: warm/dry  Cord: dry  Circumcision: n/a  Fontanels: soft/flat  Activity: alert/active        Electric breast pump: yes and a hand held pump  Breastfeeding history: yes, longest duration 21 months    Mother independently positioned and latched baby to both breast, baby transferred 25 grams/ml in about 25 mins.  Instructed mother to continue to breastfeed every 2- 3 hours for 15-20 mins each side or on demand watching for hunger cues and using waking techniques when needed. 8-12 feedings in 24 hours being the goal. Hand expression and breast compressions encouraged to increase milk supply and transfer. Discussed the benefits of colostrum, skin to skin and the importance of good positioning and latch. Discussed supply and demand. Breastfeeding book given. Instructions and handouts given over management of sore nipples, engorgement, plugged ducts, mastitis, hydration, nutrition, and medications that could effect milk supply. Mother knows when to call MD if needed.  Jaundice precautions discussed, mother knows to bring baby back for evaluation sooner if needed, if whites of baby's eyes become yellow, difficulty with waking baby for feedings and no stools.

## 2024-09-09 ENCOUNTER — POSTPARTUM VISIT (OUTPATIENT)
Dept: OBGYN CLINIC | Age: 44
End: 2024-09-09

## 2024-09-09 VITALS
HEIGHT: 68 IN | HEART RATE: 73 BPM | DIASTOLIC BLOOD PRESSURE: 92 MMHG | BODY MASS INDEX: 36.22 KG/M2 | SYSTOLIC BLOOD PRESSURE: 145 MMHG | WEIGHT: 239 LBS

## 2024-09-09 PROBLEM — O99.210 OBESITY COMPLICATING PREGNANCY, CHILDBIRTH, OR PUERPERIUM, ANTEPARTUM: Status: RESOLVED | Noted: 2024-06-04 | Resolved: 2024-09-09

## 2024-09-09 PROBLEM — O09.523 ADVANCED MATERNAL AGE IN MULTIGRAVIDA, THIRD TRIMESTER: Status: RESOLVED | Noted: 2024-06-04 | Resolved: 2024-09-09

## 2024-09-09 PROCEDURE — 0503F POSTPARTUM CARE VISIT: CPT | Performed by: ADVANCED PRACTICE MIDWIFE
